# Patient Record
Sex: FEMALE | Race: WHITE | NOT HISPANIC OR LATINO | Employment: OTHER | URBAN - METROPOLITAN AREA
[De-identification: names, ages, dates, MRNs, and addresses within clinical notes are randomized per-mention and may not be internally consistent; named-entity substitution may affect disease eponyms.]

---

## 2020-03-08 LAB
INFLUENZA A ANTIGEN, POC: NEGATIVE
INFLUENZA B ANTIGEN, POC: NEGATIVE
RAPID GROUP A STREP (OHS): NEGATIVE

## 2020-06-12 ENCOUNTER — HISTORICAL (OUTPATIENT)
Dept: ADMINISTRATIVE | Facility: HOSPITAL | Age: 70
End: 2020-06-12

## 2020-06-12 LAB
ABS NEUT (OLG): 3.6 X10(3)/MCL (ref 2.1–9.2)
ALBUMIN SERPL-MCNC: 4 GM/DL (ref 3.4–5)
ALBUMIN/GLOB SERPL: 1.2 RATIO (ref 1.1–2)
ALP SERPL-CCNC: 77 UNIT/L (ref 40–150)
ALT SERPL-CCNC: 10 UNIT/L (ref 0–55)
APPEARANCE, UA: CLEAR
AST SERPL-CCNC: 18 UNIT/L (ref 5–34)
BACTERIA SPEC CULT: NORMAL /HPF
BASOPHILS # BLD AUTO: 0 X10(3)/MCL (ref 0–0.2)
BASOPHILS NFR BLD AUTO: 1 %
BILIRUB SERPL-MCNC: 0.5 MG/DL
BILIRUB UR QL STRIP: NEGATIVE
BILIRUBIN DIRECT+TOT PNL SERPL-MCNC: 0.2 MG/DL (ref 0–0.5)
BILIRUBIN DIRECT+TOT PNL SERPL-MCNC: 0.3 MG/DL (ref 0–0.8)
BUN SERPL-MCNC: 12.5 MG/DL (ref 9.8–20.1)
CALCIUM SERPL-MCNC: 9.1 MG/DL (ref 8.4–10.2)
CHLORIDE SERPL-SCNC: 102 MMOL/L (ref 98–107)
CHOLEST SERPL-MCNC: 195 MG/DL
CHOLEST/HDLC SERPL: 3 {RATIO} (ref 0–5)
CO2 SERPL-SCNC: 30 MMOL/L (ref 23–31)
COLOR UR: YELLOW
CREAT SERPL-MCNC: 0.71 MG/DL (ref 0.55–1.02)
DEPRECATED CALCIDIOL+CALCIFEROL SERPL-MC: 64.2 NG/ML (ref 6.6–49.9)
EOSINOPHIL # BLD AUTO: 0.2 X10(3)/MCL (ref 0–0.9)
EOSINOPHIL NFR BLD AUTO: 3 %
ERYTHROCYTE [DISTWIDTH] IN BLOOD BY AUTOMATED COUNT: 12.4 % (ref 11.5–17)
GLOBULIN SER-MCNC: 3.2 GM/DL (ref 2.4–3.5)
GLUCOSE (UA): NEGATIVE
GLUCOSE SERPL-MCNC: 99 MG/DL (ref 82–115)
HCT VFR BLD AUTO: 40.4 % (ref 37–47)
HDLC SERPL-MCNC: 64 MG/DL (ref 35–60)
HGB BLD-MCNC: 12.9 GM/DL (ref 12–16)
HGB UR QL STRIP: NEGATIVE
KETONES UR QL STRIP: NEGATIVE
LDLC SERPL CALC-MCNC: 118 MG/DL (ref 50–140)
LEUKOCYTE ESTERASE UR QL STRIP: NEGATIVE
LYMPHOCYTES # BLD AUTO: 1.5 X10(3)/MCL (ref 0.6–4.6)
LYMPHOCYTES NFR BLD AUTO: 26 %
MCH RBC QN AUTO: 29.9 PG (ref 27–31)
MCHC RBC AUTO-ENTMCNC: 31.9 GM/DL (ref 33–36)
MCV RBC AUTO: 93.7 FL (ref 80–94)
MONOCYTES # BLD AUTO: 0.6 X10(3)/MCL (ref 0.1–1.3)
MONOCYTES NFR BLD AUTO: 9 %
NEUTROPHILS # BLD AUTO: 3.6 X10(3)/MCL (ref 2.1–9.2)
NEUTROPHILS NFR BLD AUTO: 61 %
NITRITE UR QL STRIP: NEGATIVE
PH UR STRIP: 7.5 [PH] (ref 5–9)
PLATELET # BLD AUTO: 274 X10(3)/MCL (ref 130–400)
PMV BLD AUTO: 9.1 FL (ref 9.4–12.4)
POTASSIUM SERPL-SCNC: 4.7 MMOL/L (ref 3.5–5.1)
PROT SERPL-MCNC: 7.2 GM/DL (ref 5.8–7.6)
PROT UR QL STRIP: NEGATIVE
RBC # BLD AUTO: 4.31 X10(6)/MCL (ref 4.2–5.4)
RBC #/AREA URNS HPF: NORMAL /[HPF]
SODIUM SERPL-SCNC: 140 MMOL/L (ref 136–145)
SP GR UR STRIP: 1.02 (ref 1–1.03)
SQUAMOUS EPITHELIAL, UA: NORMAL
T4 FREE SERPL-MCNC: 1.15 NG/DL (ref 0.7–1.48)
TRIGL SERPL-MCNC: 65 MG/DL (ref 37–140)
TSH SERPL-ACNC: 0.75 UIU/ML (ref 0.35–4.94)
UROBILINOGEN UR STRIP-ACNC: 0.2
VLDLC SERPL CALC-MCNC: 13 MG/DL
WBC # SPEC AUTO: 5.9 X10(3)/MCL (ref 4.5–11.5)
WBC #/AREA URNS HPF: NORMAL /[HPF]

## 2020-08-12 ENCOUNTER — HISTORICAL (OUTPATIENT)
Dept: ADMINISTRATIVE | Facility: HOSPITAL | Age: 70
End: 2020-08-12

## 2020-08-12 LAB
T4 FREE SERPL-MCNC: 1.26 NG/DL (ref 0.7–1.48)
TSH SERPL-ACNC: 0.63 UIU/ML (ref 0.35–4.94)

## 2021-01-05 ENCOUNTER — HISTORICAL (OUTPATIENT)
Dept: ADMINISTRATIVE | Facility: HOSPITAL | Age: 71
End: 2021-01-05

## 2021-01-05 LAB
T4 FREE SERPL-MCNC: 1.1 NG/DL (ref 0.7–1.48)
TSH SERPL-ACNC: 2.25 UIU/ML (ref 0.35–4.94)

## 2021-04-07 ENCOUNTER — HISTORICAL (OUTPATIENT)
Dept: ADMINISTRATIVE | Facility: HOSPITAL | Age: 71
End: 2021-04-07

## 2021-04-07 LAB
ABS NEUT (OLG): 2.97 X10(3)/MCL (ref 2.1–9.2)
ALBUMIN SERPL-MCNC: 4 GM/DL (ref 3.4–4.8)
ALBUMIN/GLOB SERPL: 1.3 RATIO (ref 1.1–2)
ALP SERPL-CCNC: 78 UNIT/L (ref 40–150)
ALT SERPL-CCNC: 11 UNIT/L (ref 0–55)
APPEARANCE, UA: CLEAR
AST SERPL-CCNC: 22 UNIT/L (ref 5–34)
BACTERIA SPEC CULT: NORMAL /HPF
BASOPHILS # BLD AUTO: 0 X10(3)/MCL (ref 0–0.2)
BASOPHILS NFR BLD AUTO: 1 %
BILIRUB SERPL-MCNC: 0.4 MG/DL
BILIRUB UR QL STRIP: NEGATIVE
BILIRUBIN DIRECT+TOT PNL SERPL-MCNC: 0.2 MG/DL (ref 0–0.5)
BILIRUBIN DIRECT+TOT PNL SERPL-MCNC: 0.2 MG/DL (ref 0–0.8)
BUN SERPL-MCNC: 11.6 MG/DL (ref 9.8–20.1)
CALCIUM SERPL-MCNC: 9.4 MG/DL (ref 8.4–10.2)
CHLORIDE SERPL-SCNC: 104 MMOL/L (ref 98–107)
CHOLEST SERPL-MCNC: 201 MG/DL
CHOLEST/HDLC SERPL: 3 {RATIO} (ref 0–5)
CO2 SERPL-SCNC: 29 MMOL/L (ref 23–31)
COLOR UR: YELLOW
CREAT SERPL-MCNC: 0.68 MG/DL (ref 0.55–1.02)
DEPRECATED CALCIDIOL+CALCIFEROL SERPL-MC: 67.2 NG/ML (ref 30–80)
EOSINOPHIL # BLD AUTO: 0.2 X10(3)/MCL (ref 0–0.9)
EOSINOPHIL NFR BLD AUTO: 4 %
ERYTHROCYTE [DISTWIDTH] IN BLOOD BY AUTOMATED COUNT: 12.4 % (ref 11.5–17)
GLOBULIN SER-MCNC: 3 GM/DL (ref 2.4–3.5)
GLUCOSE (UA): NEGATIVE
GLUCOSE SERPL-MCNC: 92 MG/DL (ref 82–115)
HCT VFR BLD AUTO: 38.5 % (ref 37–47)
HDLC SERPL-MCNC: 66 MG/DL (ref 35–60)
HGB BLD-MCNC: 12.4 GM/DL (ref 12–16)
HGB UR QL STRIP: NEGATIVE
KETONES UR QL STRIP: NEGATIVE
LDLC SERPL CALC-MCNC: 122 MG/DL (ref 50–140)
LEUKOCYTE ESTERASE UR QL STRIP: NEGATIVE
LYMPHOCYTES # BLD AUTO: 1.5 X10(3)/MCL (ref 0.6–4.6)
LYMPHOCYTES NFR BLD AUTO: 30 %
MCH RBC QN AUTO: 30.7 PG (ref 27–31)
MCHC RBC AUTO-ENTMCNC: 32.2 GM/DL (ref 33–36)
MCV RBC AUTO: 95.3 FL (ref 80–94)
MONOCYTES # BLD AUTO: 0.4 X10(3)/MCL (ref 0.1–1.3)
MONOCYTES NFR BLD AUTO: 9 %
NEUTROPHILS # BLD AUTO: 2.97 X10(3)/MCL (ref 2.1–9.2)
NEUTROPHILS NFR BLD AUTO: 57 %
NITRITE UR QL STRIP: NEGATIVE
PH UR STRIP: 8 [PH] (ref 5–9)
PLATELET # BLD AUTO: 271 X10(3)/MCL (ref 130–400)
PMV BLD AUTO: 9.8 FL (ref 9.4–12.4)
POTASSIUM SERPL-SCNC: 4.4 MMOL/L (ref 3.5–5.1)
PROT SERPL-MCNC: 7 GM/DL (ref 5.8–7.6)
PROT UR QL STRIP: NEGATIVE
RBC # BLD AUTO: 4.04 X10(6)/MCL (ref 4.2–5.4)
RBC #/AREA URNS HPF: NORMAL /[HPF]
SODIUM SERPL-SCNC: 144 MMOL/L (ref 136–145)
SP GR UR STRIP: 1.02 (ref 1–1.03)
SQUAMOUS EPITHELIAL, UA: NORMAL /HPF (ref 0–4)
TRIGL SERPL-MCNC: 66 MG/DL (ref 37–140)
TSH SERPL-ACNC: 1.94 UIU/ML (ref 0.35–4.94)
UROBILINOGEN UR STRIP-ACNC: 0.2
VLDLC SERPL CALC-MCNC: 13 MG/DL
WBC # SPEC AUTO: 5.2 X10(3)/MCL (ref 4.5–11.5)
WBC #/AREA URNS HPF: NORMAL /[HPF]

## 2021-04-29 ENCOUNTER — HISTORICAL (OUTPATIENT)
Dept: ADMINISTRATIVE | Facility: HOSPITAL | Age: 71
End: 2021-04-29

## 2021-08-03 ENCOUNTER — HISTORICAL (OUTPATIENT)
Dept: ADMINISTRATIVE | Facility: HOSPITAL | Age: 71
End: 2021-08-03

## 2021-09-07 ENCOUNTER — HISTORICAL (OUTPATIENT)
Dept: ADMINISTRATIVE | Facility: HOSPITAL | Age: 71
End: 2021-09-07

## 2021-09-07 LAB — TSH SERPL-ACNC: 2.15 UIU/ML (ref 0.35–4.94)

## 2021-10-11 ENCOUNTER — HISTORICAL (OUTPATIENT)
Dept: LAB | Facility: HOSPITAL | Age: 71
End: 2021-10-11

## 2021-10-11 LAB
ABS NEUT (OLG): 4.17 X10(3)/MCL (ref 2.1–9.2)
ALBUMIN SERPL-MCNC: 4.1 GM/DL (ref 3.4–4.8)
ALBUMIN/GLOB SERPL: 1.3 RATIO (ref 1.1–2)
ALP SERPL-CCNC: 75 UNIT/L (ref 40–150)
ALT SERPL-CCNC: 10 UNIT/L (ref 0–55)
APPEARANCE, UA: CLEAR
AST SERPL-CCNC: 20 UNIT/L (ref 5–34)
BACTERIA SPEC CULT: NORMAL /HPF
BASOPHILS # BLD AUTO: 0 X10(3)/MCL (ref 0–0.2)
BASOPHILS NFR BLD AUTO: 1 %
BILIRUB SERPL-MCNC: 0.4 MG/DL
BILIRUB UR QL STRIP: NEGATIVE
BILIRUBIN DIRECT+TOT PNL SERPL-MCNC: 0.2 MG/DL (ref 0–0.5)
BILIRUBIN DIRECT+TOT PNL SERPL-MCNC: 0.2 MG/DL (ref 0–0.8)
BUN SERPL-MCNC: 16.7 MG/DL (ref 9.8–20.1)
CALCIUM SERPL-MCNC: 10 MG/DL (ref 8.4–10.2)
CHLORIDE SERPL-SCNC: 105 MMOL/L (ref 98–107)
CHOLEST SERPL-MCNC: 216 MG/DL
CHOLEST/HDLC SERPL: 3 {RATIO} (ref 0–5)
CO2 SERPL-SCNC: 29 MMOL/L (ref 23–31)
COLOR UR: YELLOW
CREAT SERPL-MCNC: 0.83 MG/DL (ref 0.55–1.02)
EOSINOPHIL # BLD AUTO: 0.2 X10(3)/MCL (ref 0–0.9)
EOSINOPHIL NFR BLD AUTO: 2 %
ERYTHROCYTE [DISTWIDTH] IN BLOOD BY AUTOMATED COUNT: 13 % (ref 11.5–17)
GLOBULIN SER-MCNC: 3.2 GM/DL (ref 2.4–3.5)
GLUCOSE (UA): NEGATIVE
GLUCOSE SERPL-MCNC: 100 MG/DL (ref 82–115)
HCT VFR BLD AUTO: 37.5 % (ref 37–47)
HDLC SERPL-MCNC: 72 MG/DL (ref 35–60)
HGB BLD-MCNC: 11.7 GM/DL (ref 12–16)
HGB UR QL STRIP: NEGATIVE
KETONES UR QL STRIP: NEGATIVE
LDLC SERPL CALC-MCNC: 132 MG/DL (ref 50–140)
LEUKOCYTE ESTERASE UR QL STRIP: NEGATIVE
LYMPHOCYTES # BLD AUTO: 1.8 X10(3)/MCL (ref 0.6–4.6)
LYMPHOCYTES NFR BLD AUTO: 27 %
MCH RBC QN AUTO: 30.7 PG (ref 27–31)
MCHC RBC AUTO-ENTMCNC: 31.2 GM/DL (ref 33–36)
MCV RBC AUTO: 98.4 FL (ref 80–94)
MONOCYTES # BLD AUTO: 0.5 X10(3)/MCL (ref 0.1–1.3)
MONOCYTES NFR BLD AUTO: 8 %
NEUTROPHILS # BLD AUTO: 4.17 X10(3)/MCL (ref 2.1–9.2)
NEUTROPHILS NFR BLD AUTO: 62 %
NITRITE UR QL STRIP: NEGATIVE
PH UR STRIP: 7 [PH] (ref 5–9)
PLATELET # BLD AUTO: 322 X10(3)/MCL (ref 130–400)
PMV BLD AUTO: 10.5 FL (ref 9.4–12.4)
POTASSIUM SERPL-SCNC: 4.6 MMOL/L (ref 3.5–5.1)
PROT SERPL-MCNC: 7.3 GM/DL (ref 5.8–7.6)
PROT UR QL STRIP: NEGATIVE
RBC # BLD AUTO: 3.81 X10(6)/MCL (ref 4.2–5.4)
RBC #/AREA URNS HPF: NORMAL /[HPF]
SODIUM SERPL-SCNC: 142 MMOL/L (ref 136–145)
SP GR UR STRIP: 1.02 (ref 1–1.03)
SQUAMOUS EPITHELIAL, UA: NORMAL /HPF (ref 0–4)
TRIGL SERPL-MCNC: 58 MG/DL (ref 37–140)
TSH SERPL-ACNC: 1.43 UIU/ML (ref 0.35–4.94)
UROBILINOGEN UR STRIP-ACNC: 0.2
VLDLC SERPL CALC-MCNC: 12 MG/DL
WBC # SPEC AUTO: 6.7 X10(3)/MCL (ref 4.5–11.5)
WBC #/AREA URNS HPF: NORMAL /[HPF]

## 2021-10-13 ENCOUNTER — HISTORICAL (OUTPATIENT)
Dept: ADMINISTRATIVE | Facility: HOSPITAL | Age: 71
End: 2021-10-13

## 2021-10-20 ENCOUNTER — HISTORICAL (OUTPATIENT)
Dept: ADMINISTRATIVE | Facility: HOSPITAL | Age: 71
End: 2021-10-20

## 2022-04-11 ENCOUNTER — HISTORICAL (OUTPATIENT)
Dept: ADMINISTRATIVE | Facility: HOSPITAL | Age: 72
End: 2022-04-11

## 2022-04-29 VITALS
BODY MASS INDEX: 38.91 KG/M2 | HEIGHT: 64 IN | SYSTOLIC BLOOD PRESSURE: 133 MMHG | OXYGEN SATURATION: 99 % | WEIGHT: 227.94 LBS | DIASTOLIC BLOOD PRESSURE: 79 MMHG

## 2022-05-09 ENCOUNTER — TELEPHONE (OUTPATIENT)
Dept: INTERNAL MEDICINE | Facility: CLINIC | Age: 72
End: 2022-05-09
Payer: MEDICARE

## 2022-05-09 DIAGNOSIS — I10 HYPERTENSION, UNSPECIFIED TYPE: Primary | ICD-10-CM

## 2022-05-09 DIAGNOSIS — E03.9 HYPOTHYROIDISM, UNSPECIFIED TYPE: ICD-10-CM

## 2022-05-09 NOTE — TELEPHONE ENCOUNTER
----- Message from Josy Cazares sent at 5/9/2022 11:06 AM CDT -----  Regarding: lab orders  Pt appt on 05/24 Does pt need any labs?

## 2022-05-17 ENCOUNTER — TELEPHONE (OUTPATIENT)
Dept: INTERNAL MEDICINE | Facility: CLINIC | Age: 72
End: 2022-05-17
Payer: MEDICARE

## 2022-05-17 DIAGNOSIS — E03.9 HYPOTHYROIDISM, UNSPECIFIED TYPE: ICD-10-CM

## 2022-05-17 DIAGNOSIS — I10 HYPERTENSION, UNSPECIFIED TYPE: Primary | ICD-10-CM

## 2022-05-17 NOTE — TELEPHONE ENCOUNTER
----- Message from Uriel Nguyen MA sent at 5/17/2022  7:49 AM CDT -----  Regarding: PV 5/24/22 @ 10:40 Dr. Cornejo  1. Are there any outstanding tasks in the patient's chart? Yes, fasting labs    2. Is there any documentation in the chart? No    3.Has patient been seen in an ER, Urgent care clinic, or been admitted since last visit?  If yes, When, where, and why    4. Has patient seen any other healthcare providers since last visit?  If yes, when, where, and why    5. Has patient had any bloodwork or XR done since last visit?    6. Is patient signed up for patient portal?

## 2022-05-24 ENCOUNTER — OFFICE VISIT (OUTPATIENT)
Dept: INTERNAL MEDICINE | Facility: CLINIC | Age: 72
End: 2022-05-24
Payer: MEDICARE

## 2022-05-24 VITALS
OXYGEN SATURATION: 96 % | RESPIRATION RATE: 16 BRPM | HEART RATE: 92 BPM | SYSTOLIC BLOOD PRESSURE: 132 MMHG | BODY MASS INDEX: 40.02 KG/M2 | HEIGHT: 64 IN | DIASTOLIC BLOOD PRESSURE: 76 MMHG | WEIGHT: 234.44 LBS | TEMPERATURE: 98 F

## 2022-05-24 DIAGNOSIS — Z78.0 POSTMENOPAUSAL STATE: ICD-10-CM

## 2022-05-24 DIAGNOSIS — I48.0 PAROXYSMAL ATRIAL FIBRILLATION: ICD-10-CM

## 2022-05-24 DIAGNOSIS — E66.01 MORBID OBESITY: ICD-10-CM

## 2022-05-24 DIAGNOSIS — M51.36 DEGENERATION OF INTERVERTEBRAL DISC OF LUMBAR REGION: Primary | ICD-10-CM

## 2022-05-24 DIAGNOSIS — M54.16 LUMBAR RADICULOPATHY, RIGHT: ICD-10-CM

## 2022-05-24 DIAGNOSIS — I10 HYPERTENSION, UNSPECIFIED TYPE: ICD-10-CM

## 2022-05-24 DIAGNOSIS — K21.9 GASTROESOPHAGEAL REFLUX DISEASE, UNSPECIFIED WHETHER ESOPHAGITIS PRESENT: ICD-10-CM

## 2022-05-24 DIAGNOSIS — E03.9 HYPOTHYROIDISM, UNSPECIFIED TYPE: ICD-10-CM

## 2022-05-24 PROBLEM — M51.369 DEGENERATION OF INTERVERTEBRAL DISC OF LUMBAR REGION: Status: ACTIVE | Noted: 2022-05-24

## 2022-05-24 PROCEDURE — 4010F ACE/ARB THERAPY RXD/TAKEN: CPT | Mod: CPTII,,, | Performed by: INTERNAL MEDICINE

## 2022-05-24 PROCEDURE — 99214 PR OFFICE/OUTPT VISIT, EST, LEVL IV, 30-39 MIN: ICD-10-PCS | Mod: ,,, | Performed by: INTERNAL MEDICINE

## 2022-05-24 PROCEDURE — 3288F PR FALLS RISK ASSESSMENT DOCUMENTED: ICD-10-PCS | Mod: CPTII,,, | Performed by: INTERNAL MEDICINE

## 2022-05-24 PROCEDURE — 1101F PT FALLS ASSESS-DOCD LE1/YR: CPT | Mod: CPTII,,, | Performed by: INTERNAL MEDICINE

## 2022-05-24 PROCEDURE — 3008F PR BODY MASS INDEX (BMI) DOCUMENTED: ICD-10-PCS | Mod: CPTII,,, | Performed by: INTERNAL MEDICINE

## 2022-05-24 PROCEDURE — 3288F FALL RISK ASSESSMENT DOCD: CPT | Mod: CPTII,,, | Performed by: INTERNAL MEDICINE

## 2022-05-24 PROCEDURE — 3078F PR MOST RECENT DIASTOLIC BLOOD PRESSURE < 80 MM HG: ICD-10-PCS | Mod: CPTII,,, | Performed by: INTERNAL MEDICINE

## 2022-05-24 PROCEDURE — 3008F BODY MASS INDEX DOCD: CPT | Mod: CPTII,,, | Performed by: INTERNAL MEDICINE

## 2022-05-24 PROCEDURE — 3075F PR MOST RECENT SYSTOLIC BLOOD PRESS GE 130-139MM HG: ICD-10-PCS | Mod: CPTII,,, | Performed by: INTERNAL MEDICINE

## 2022-05-24 PROCEDURE — 1159F PR MEDICATION LIST DOCUMENTED IN MEDICAL RECORD: ICD-10-PCS | Mod: CPTII,,, | Performed by: INTERNAL MEDICINE

## 2022-05-24 PROCEDURE — 1159F MED LIST DOCD IN RCRD: CPT | Mod: CPTII,,, | Performed by: INTERNAL MEDICINE

## 2022-05-24 PROCEDURE — 4010F PR ACE/ARB THEARPY RXD/TAKEN: ICD-10-PCS | Mod: CPTII,,, | Performed by: INTERNAL MEDICINE

## 2022-05-24 PROCEDURE — 1101F PR PT FALLS ASSESS DOC 0-1 FALLS W/OUT INJ PAST YR: ICD-10-PCS | Mod: CPTII,,, | Performed by: INTERNAL MEDICINE

## 2022-05-24 PROCEDURE — 3075F SYST BP GE 130 - 139MM HG: CPT | Mod: CPTII,,, | Performed by: INTERNAL MEDICINE

## 2022-05-24 PROCEDURE — 3078F DIAST BP <80 MM HG: CPT | Mod: CPTII,,, | Performed by: INTERNAL MEDICINE

## 2022-05-24 PROCEDURE — 99214 OFFICE O/P EST MOD 30 MIN: CPT | Mod: ,,, | Performed by: INTERNAL MEDICINE

## 2022-05-24 PROCEDURE — 1160F RVW MEDS BY RX/DR IN RCRD: CPT | Mod: CPTII,,, | Performed by: INTERNAL MEDICINE

## 2022-05-24 PROCEDURE — 1160F PR REVIEW ALL MEDS BY PRESCRIBER/CLIN PHARMACIST DOCUMENTED: ICD-10-PCS | Mod: CPTII,,, | Performed by: INTERNAL MEDICINE

## 2022-05-24 RX ORDER — LEVOTHYROXINE SODIUM 112 UG/1
112 TABLET ORAL DAILY
COMMUNITY
Start: 2022-03-07 | End: 2023-04-24

## 2022-05-24 RX ORDER — CETIRIZINE HYDROCHLORIDE 10 MG/1
10 TABLET ORAL DAILY
COMMUNITY
Start: 2022-03-10 | End: 2024-02-07

## 2022-05-24 RX ORDER — RIVAROXABAN 20 MG/1
20 TABLET, FILM COATED ORAL DAILY
COMMUNITY
Start: 2022-02-07 | End: 2024-02-07

## 2022-05-24 RX ORDER — DILTIAZEM HYDROCHLORIDE 120 MG/1
120 CAPSULE, COATED, EXTENDED RELEASE ORAL DAILY
COMMUNITY
Start: 2022-02-15

## 2022-05-24 RX ORDER — LISINOPRIL 10 MG/1
10 TABLET ORAL DAILY
COMMUNITY
Start: 2022-03-21 | End: 2023-06-05

## 2022-05-24 RX ORDER — ACETAMINOPHEN 500 MG
5000 TABLET ORAL DAILY
COMMUNITY

## 2022-05-24 RX ORDER — OMEPRAZOLE 20 MG/1
20 CAPSULE, DELAYED RELEASE ORAL 2 TIMES DAILY
COMMUNITY
Start: 2022-04-07

## 2022-05-24 RX ORDER — GABAPENTIN 100 MG/1
100 CAPSULE ORAL 3 TIMES DAILY
Qty: 270 CAPSULE | Refills: 3 | Status: SHIPPED | OUTPATIENT
Start: 2022-05-24 | End: 2022-05-31 | Stop reason: SDUPTHER

## 2022-05-24 RX ORDER — GABAPENTIN 100 MG/1
100 CAPSULE ORAL 3 TIMES DAILY
COMMUNITY
Start: 2022-01-18 | End: 2022-05-24 | Stop reason: SDUPTHER

## 2022-05-24 RX ORDER — MELATONIN 3 MG
3 CAPSULE ORAL NIGHTLY
COMMUNITY
End: 2023-06-07 | Stop reason: SDUPTHER

## 2022-05-24 RX ORDER — DULOXETIN HYDROCHLORIDE 30 MG/1
1 CAPSULE, DELAYED RELEASE ORAL NIGHTLY
COMMUNITY
Start: 2021-11-08 | End: 2022-10-25

## 2022-05-24 RX ORDER — ALPRAZOLAM 0.25 MG/1
0.25 TABLET ORAL
COMMUNITY
End: 2022-10-25

## 2022-05-24 RX ORDER — HYDROCHLOROTHIAZIDE 25 MG/1
25 TABLET ORAL DAILY
COMMUNITY
Start: 2022-03-16 | End: 2022-06-14

## 2022-05-24 NOTE — PROGRESS NOTES
Subjective:      Patient ID: Ngozi Webb is a 72 y.o. female.    Chief Complaint: Hypertension and Thyroid Problem      HPI:  71 year old female presents to clinic for 6 month revisit  Hx of lumbar laminectomy 1983?  Kyphoplasty in 2014 after L4 fracture after sustaining a fall.  Has osteopenia.  Dr. 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 ER/AR + and HER-2 status unknown.  History of Barrets in 2006; Dr. Jean had EGD 10/2021   Has osteopenia.  C scope in [facility]  COVID vaccines UTD  Needs flu vaccine and shingles vaccine  Dr. Vu with [facility] 1/2022; 3/2022 with no improvement; referred to Cassius Hammer for surgical consideration for SCS vs Surgical eval. Dr. Lam appointment pending for June 1st. Had fall in January with recent MRI in January.        Problem List Items Addressed This Visit        Neuro    Degeneration of intervertebral disc of lumbar region - Primary    Lumbar radiculopathy, right    Current Assessment & Plan     Has upcoming appointment in [place] with Dr. Lam, request MRI report from January.              Cardiac/Vascular    Hypertension    Current Assessment & Plan     At goal, continue current regimen           Paroxysmal atrial fibrillation    Current Assessment & Plan     Continue anticoagulation and rate control              Endocrine    Hypothyroidism    Overview     Formatting of this note might be different from the original.  ICD-10 Transition           Current Assessment & Plan     Continue current management, RTC 6 months for wellness, sooner if needed           Morbid obesity       GI    Gastroesophageal reflux disease      Other Visit Diagnoses     Postmenopausal state        Relevant Orders    DXA Bone Density Spine And Hip              Past Medical History:  Past Medical History:   Diagnosis Date    DDD (degenerative disc disease), lumbar     GERD (gastroesophageal reflux disease)      "Hypertension     Hyperthyroidism     Lumbar radiculopathy     Morbid obesity     Osteoarthritis of both knees     Paroxysmal atrial fibrillation      Past Surgical History:   Procedure Laterality Date     SECTION      CHOLECYSTECTOMY      COLONOSCOPY  10/04/2021     Review of patient's allergies indicates:   Allergen Reactions    Meperidine Nausea Only    Midazolam Nausea And Vomiting     "Makes her sick"       Current Outpatient Medications on File Prior to Visit   Medication Sig Dispense Refill    ALPRAZolam (XANAX) 0.25 MG tablet Take 0.25 mg by mouth as needed.      calcium carbonate (CALCIUM 600 ORAL) Take 1,200 mg by mouth Daily.      cetirizine (ZYRTEC) 10 MG tablet Take 10 mg by mouth once daily.      cholecalciferol, vitamin D3, (VITAMIN D3) 50 mcg (2,000 unit) Cap capsule Take 5,000 Units by mouth Daily.      diltiaZEM (CARDIZEM CD) 120 MG Cp24 Take 120 mg by mouth once daily.      DULoxetine (CYMBALTA) 30 MG capsule Take 1 capsule by mouth nightly.      hydroCHLOROthiazide (HYDRODIURIL) 25 MG tablet Take 25 mg by mouth once daily.      lisinopriL 10 MG tablet Take 10 mg by mouth once daily.      melatonin 3 mg Cap Take 3 mg by mouth nightly.      omeprazole (PRILOSEC) 20 MG capsule Take 20 mg by mouth 2 (two) times daily.      SYNTHROID 112 mcg tablet Take 112 mcg by mouth once daily.      XARELTO 20 mg Tab Take 20 mg by mouth once daily.       No current facility-administered medications on file prior to visit.     Social History     Socioeconomic History    Marital status:    Tobacco Use    Smoking status: Never Smoker    Smokeless tobacco: Never Used   Substance and Sexual Activity    Alcohol use: Not Currently    Drug use: Never    Sexual activity: Not Currently     Family History   Problem Relation Age of Onset    Uterine cancer Mother            Review of Systems  Constitutional: No fever,  no fatigue, no chills, no night sweats, no weight gain, no weight " "loss, no changes in appetite.   Eye: No redness, no acute vision loss, no blurred vision, no double vision, no eye pain  ENMT: No sore throat, no nasal drainage, no nose bleeds,  no headache, no ear pain, no ear drainage, no acute hearing loss  Respiratory: No cough, no sputum production, no shortness of breath, no hemoptysis, no wheezing.  Cardiovascular: No chest pain, no chest tightness, no BELL, no PND, no orthopnea, no swelling, no palpitations.  Gastrointestinal: No abdominal pain, no nausea, no vomiting, no diarrhea, no constipation, no difficulty swallowing, no change in bowel habits, no rectal bleeding  Genitourinary: no urgency, no frequency, no burning or pain when urinating, no blood in urine, no incontinence  Heme/Lymph: No easy bruising and/or bleeding, no swollen or painful glands.  Endocrine: No polyuria, no polydipsia, no polyphagia, no heat or cold intolerance.  Musculoskeletal: No muscle pain, no muscle weakness, no joint pain, no red or swollen joints.  Integumentary: No rash, no pruritis, no hair or nail changes.  Neurologic: No dizziness, no fainting, no tremors, + tingling and/ or numbness.  Psychiatric: No anxiety, no depression, no memory loss  All Other ROS: Negative with exception of what is documented in the history of present illness     Objective:   /76 (BP Location: Right arm, Patient Position: Sitting, BP Method: Large (Manual))   Pulse 92   Temp 97.6 °F (36.4 °C) (Temporal)   Resp 16   Ht 5' 4" (1.626 m)   Wt 106.4 kg (234 lb 7.4 oz)   SpO2 96%   BMI 40.24 kg/m²     Physical Exam  General : Alert and oriented, No acute distress, well, developed, well nourished, afebrile , Obese  Eye : PERRLA. EOMI. Normal conjunctiva without injection. Sclerae are nonicteric. No pallor.  HEENT : Normocephalic. Neck supple. Normal hearing. Oral mucosa is moist.  Respiratory : Lungs are clear to auscultation bilaterally, non-labored. Symmetrical chest wall expansion. No crackles, wheeze, or " rhonci.  Cardiovascular : Normal rate, Regular rhythm. No murmurs, rubs, or gallops. Pulses 2+ in all extremities. No Edema.  Gastrointestinal : Soft, nontender, non-distended, bowel sounds normal, no organomegaly, no guarding, no rebound.  Musculoskeletal : Normal ROM.  No muscle tenderness.  Integumentary : Warm to touch. Intact. No rash.    Neurologic : Alert and oriented. No focal deficits. Cranial Nerves II-XII are grossly intact.  Lymph: No palpable lymphadenopathy appreciated.  Psychiatric : Cooperative, Appropriate mood & affect. Normal judgment.          Assessment:     1. Degeneration of intervertebral disc of lumbar region    2. Hypertension, unspecified type    3. Paroxysmal atrial fibrillation    4. Hypothyroidism, unspecified type    5. Morbid obesity    6. Gastroesophageal reflux disease, unspecified whether esophagitis present    7. Lumbar radiculopathy, right    8. Postmenopausal state                  Plan:       I have changed Ngozi Webb's gabapentin. I am also having her maintain her calcium carbonate (CALCIUM 600 ORAL), melatonin, cholecalciferol (vitamin D3), ALPRAZolam, cetirizine, diltiaZEM, DULoxetine, hydroCHLOROthiazide, SYNTHROID, lisinopriL, omeprazole, and XARELTO.      Problem List Items Addressed This Visit        Neuro    Degeneration of intervertebral disc of lumbar region - Primary    Lumbar radiculopathy, right     Has upcoming appointment in [place] with Dr. Lam, request MRI report from January.              Cardiac/Vascular    Hypertension     At goal, continue current regimen           Paroxysmal atrial fibrillation     Continue anticoagulation and rate control              Endocrine    Hypothyroidism     Continue current management, RTC 6 months for wellness, sooner if needed           Morbid obesity       GI    Gastroesophageal reflux disease      Other Visit Diagnoses     Postmenopausal state        Relevant Orders    DXA Bone Density Spine And Hip             Ngozi was seen today for hypertension and thyroid problem.    Diagnoses and all orders for this visit:    Degeneration of intervertebral disc of lumbar region    Hypertension, unspecified type    Paroxysmal atrial fibrillation    Hypothyroidism, unspecified type    Morbid obesity    Gastroesophageal reflux disease, unspecified whether esophagitis present    Lumbar radiculopathy, right    Postmenopausal state  -     DXA Bone Density Spine And Hip; Future    Other orders  -     gabapentin (NEURONTIN) 100 MG capsule; Take 1 capsule (100 mg total) by mouth 3 (three) times daily.            Medications Ordered This Encounter   Medications    gabapentin (NEURONTIN) 100 MG capsule     Sig: Take 1 capsule (100 mg total) by mouth 3 (three) times daily.     Dispense:  270 capsule     Refill:  3     [unfilled]  Orders Placed This Encounter   Procedures    DXA Bone Density Spine And Hip     Standing Status:   Future     Standing Expiration Date:   5/24/2025     Order Specific Question:   May the Radiologist modify the order per protocol to meet the clinical needs of the patient?     Answer:   Yes     Order Specific Question:   Release to patient     Answer:   Immediate       Medication List with Changes/Refills   New Medications    GABAPENTIN (NEURONTIN) 100 MG CAPSULE    Take 1 capsule (100 mg total) by mouth 3 (three) times daily.   Current Medications    ALPRAZOLAM (XANAX) 0.25 MG TABLET    Take 0.25 mg by mouth as needed.    CALCIUM CARBONATE (CALCIUM 600 ORAL)    Take 1,200 mg by mouth Daily.    CETIRIZINE (ZYRTEC) 10 MG TABLET    Take 10 mg by mouth once daily.    CHOLECALCIFEROL, VITAMIN D3, (VITAMIN D3) 50 MCG (2,000 UNIT) CAP CAPSULE    Take 5,000 Units by mouth Daily.    DILTIAZEM (CARDIZEM CD) 120 MG CP24    Take 120 mg by mouth once daily.    DULOXETINE (CYMBALTA) 30 MG CAPSULE    Take 1 capsule by mouth nightly.    HYDROCHLOROTHIAZIDE (HYDRODIURIL) 25 MG TABLET    Take 25 mg by mouth once daily.     LISINOPRIL 10 MG TABLET    Take 10 mg by mouth once daily.    MELATONIN 3 MG CAP    Take 3 mg by mouth nightly.    OMEPRAZOLE (PRILOSEC) 20 MG CAPSULE    Take 20 mg by mouth 2 (two) times daily.    SYNTHROID 112 MCG TABLET    Take 112 mcg by mouth once daily.    XARELTO 20 MG TAB    Take 20 mg by mouth once daily.   Discontinued Medications    GABAPENTIN (NEURONTIN) 100 MG CAPSULE    Take 100 mg by mouth 3 (three) times daily.      Medication List with Changes/Refills   New Medications    GABAPENTIN (NEURONTIN) 100 MG CAPSULE    Take 1 capsule (100 mg total) by mouth 3 (three) times daily.       Start Date: 5/24/2022 End Date: --   Current Medications    ALPRAZOLAM (XANAX) 0.25 MG TABLET    Take 0.25 mg by mouth as needed.       Start Date: --        End Date: --    CALCIUM CARBONATE (CALCIUM 600 ORAL)    Take 1,200 mg by mouth Daily.       Start Date: --        End Date: --    CETIRIZINE (ZYRTEC) 10 MG TABLET    Take 10 mg by mouth once daily.       Start Date: 3/10/2022 End Date: --    CHOLECALCIFEROL, VITAMIN D3, (VITAMIN D3) 50 MCG (2,000 UNIT) CAP CAPSULE    Take 5,000 Units by mouth Daily.       Start Date: --        End Date: --    DILTIAZEM (CARDIZEM CD) 120 MG CP24    Take 120 mg by mouth once daily.       Start Date: 2/15/2022 End Date: --    DULOXETINE (CYMBALTA) 30 MG CAPSULE    Take 1 capsule by mouth nightly.       Start Date: 11/8/2021 End Date: --    HYDROCHLOROTHIAZIDE (HYDRODIURIL) 25 MG TABLET    Take 25 mg by mouth once daily.       Start Date: 3/16/2022 End Date: --    LISINOPRIL 10 MG TABLET    Take 10 mg by mouth once daily.       Start Date: 3/21/2022 End Date: --    MELATONIN 3 MG CAP    Take 3 mg by mouth nightly.       Start Date: --        End Date: --    OMEPRAZOLE (PRILOSEC) 20 MG CAPSULE    Take 20 mg by mouth 2 (two) times daily.       Start Date: 4/7/2022  End Date: --    SYNTHROID 112 MCG TABLET    Take 112 mcg by mouth once daily.       Start Date: 3/7/2022  End Date: --    XARELTO  20 MG TAB    Take 20 mg by mouth once daily.       Start Date: 2/7/2022  End Date: --   Discontinued Medications    GABAPENTIN (NEURONTIN) 100 MG CAPSULE    Take 100 mg by mouth 3 (three) times daily.       Start Date: 1/18/2022 End Date: 5/24/2022            Follow up in about 6 months (around 11/24/2022) for NURSE PRACTITIONER, with labs prior to visit, [facility].

## 2022-05-24 NOTE — PROGRESS NOTES
"Subjective:      Patient ID: Ngozi Webb is a 72 y.o. female.    Chief Complaint: Hypertension and Thyroid Problem      HPI:  71 year old female presents to clinic for 6 month revisit  Hx of lumbar laminectomy ?  Kyphoplasty in  after L4 fracture after sustaining a fall.  Has osteopenia.  Dr. 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 ER/AR + and HER-2 status unknown.  History of [facility]rets in ; Dr. Jean had EGD 10/2021   Has osteopenia.  C scope in [facility]  COVID vaccines UTD  Needs flu vaccine and shingles vaccine  Dr. Vu with [facility] 2022; 3/2022 with no improvement; referred to Cassius Hammer for surgical consideration for SCS vs Surgical eval. Dr. Lam appointment pending for . Had fall in January with recent MRI in January.        Problem List Items Addressed This Visit    None             Past Medical History:  Past Medical History:   Diagnosis Date    DDD (degenerative disc disease), lumbar     GERD (gastroesophageal reflux disease)     Hypertension     Hyperthyroidism     Lumbar radiculopathy     Morbid obesity     Osteoarthritis of both knees     Paroxysmal atrial fibrillation      Past Surgical History:   Procedure Laterality Date     SECTION      CHOLECYSTECTOMY      COLONOSCOPY  10/04/2021     Review of patient's allergies indicates:   Allergen Reactions    Meperidine Nausea Only    Midazolam Nausea And Vomiting     "Makes her sick"       Current Outpatient Medications on File Prior to Visit   Medication Sig Dispense Refill    ALPRAZolam (XANAX) 0.25 MG tablet Take 0.25 mg by mouth as needed.      calcium carbonate (CALCIUM 600 ORAL) Take 1,200 mg by mouth Daily.      cetirizine (ZYRTEC) 10 MG tablet Take 10 mg by mouth once daily.      cholecalciferol, vitamin D3, (VITAMIN D3) 50 mcg (2,000 unit) Cap capsule Take 5,000 Units by mouth Daily.      diltiaZEM (CARDIZEM CD) 120 MG " Cp24 Take 120 mg by mouth once daily.      DULoxetine (CYMBALTA) 30 MG capsule Take 1 capsule by mouth nightly.      gabapentin (NEURONTIN) 100 MG capsule Take 100 mg by mouth 3 (three) times daily.      hydroCHLOROthiazide (HYDRODIURIL) 25 MG tablet Take 25 mg by mouth once daily.      lisinopriL 10 MG tablet Take 10 mg by mouth once daily.      melatonin 3 mg Cap Take 3 mg by mouth nightly.      omeprazole (PRILOSEC) 20 MG capsule Take 20 mg by mouth 2 (two) times daily.      SYNTHROID 112 mcg tablet Take 112 mcg by mouth once daily.      XARELTO 20 mg Tab Take 20 mg by mouth once daily.       No current facility-administered medications on file prior to visit.     Social History     Socioeconomic History    Marital status:    Tobacco Use    Smoking status: Never Smoker    Smokeless tobacco: Never Used   Substance and Sexual Activity    Alcohol use: Not Currently    Drug use: Never    Sexual activity: Not Currently     Family History   Problem Relation Age of Onset    Uterine cancer Mother            Review of Systems  Constitutional: No fever,  no fatigue, no chills, no night sweats, no weight gain, no weight loss, no changes in appetite.   Eye: No redness, no acute vision loss, no blurred vision, no double vision, no eye pain  ENMT: No sore throat, no nasal drainage, no nose bleeds,  no headache, no ear pain, no ear drainage, no acute hearing loss  Respiratory: No cough, no sputum production, no shortness of breath, no hemoptysis, no wheezing.  Cardiovascular: No chest pain, no chest tightness, no BELL, no PND, no orthopnea, no swelling, no palpitations.  Gastrointestinal: No abdominal pain, no nausea, no vomiting, no diarrhea, no constipation, no difficulty swallowing, no change in bowel habits, no rectal bleeding  Genitourinary: no urgency, no frequency, no burning or pain when urinating, no blood in urine, no incontinence  Heme/Lymph: No easy bruising and/or bleeding, no swollen or  "painful glands.  Endocrine: No polyuria, no polydipsia, no polyphagia, no heat or cold intolerance.  Musculoskeletal: No muscle pain, no muscle weakness, no joint pain, no red or swollen joints.  Integumentary: No rash, no pruritis, no hair or nail changes.  Neurologic: No dizziness, no fainting, no tremors, + tingling and/ or numbness.  Psychiatric: No anxiety, no depression, no memory loss  All Other ROS: Negative with exception of what is documented in the history of present illness     Objective:   /76 (BP Location: Right arm, Patient Position: Sitting, BP Method: Large (Manual))   Pulse 92   Temp 97.6 °F (36.4 °C) (Temporal)   Resp 16   Ht 5' 4" (1.626 m)   Wt 106.4 kg (234 lb 7.4 oz)   SpO2 96%   BMI 40.24 kg/m²     Physical Exam  General : Alert and oriented, No acute distress, well, developed, well nourished, afebrile , Obese  Eye : PERRLA. EOMI. Normal conjunctiva without injection. Sclerae are nonicteric. No pallor.  HEENT : Normocephalic. Neck supple. Normal hearing. Oral mucosa is moist.  Respiratory : Lungs are clear to auscultation bilaterally, non-labored. Symmetrical chest wall expansion. No crackles, wheeze, or rhonci.  Cardiovascular : Normal rate, Regular rhythm. No murmurs, rubs, or gallops. Pulses 2+ in all extremities. No Edema.  Gastrointestinal : Soft, nontender, non-distended, bowel sounds normal, no organomegaly, no guarding, no rebound.  Musculoskeletal : Normal ROM.  No muscle tenderness.  Integumentary : Warm to touch. Intact. No rash.    Neurologic : Alert and oriented. No focal deficits. Cranial Nerves II-XII are grossly intact.  Lymph: No palpable lymphadenopathy appreciated.  Psychiatric : Cooperative, Appropriate mood & affect. Normal judgment.          Assessment:   No diagnosis found.              Plan:       I am having Ngozi Webb maintain her calcium carbonate (CALCIUM 600 ORAL), melatonin, cholecalciferol (vitamin D3), ALPRAZolam, cetirizine, diltiaZEM, " DULoxetine, gabapentin, hydroCHLOROthiazide, SYNTHROID, lisinopriL, omeprazole, and XARELTO.      Problem List Items Addressed This Visit    None           There are no diagnoses linked to this encounter.           [unfilled]  No orders of the defined types were placed in this encounter.      Medication List with Changes/Refills   Current Medications    ALPRAZOLAM (XANAX) 0.25 MG TABLET    Take 0.25 mg by mouth as needed.    CALCIUM CARBONATE (CALCIUM 600 ORAL)    Take 1,200 mg by mouth Daily.    CETIRIZINE (ZYRTEC) 10 MG TABLET    Take 10 mg by mouth once daily.    CHOLECALCIFEROL, VITAMIN D3, (VITAMIN D3) 50 MCG (2,000 UNIT) CAP CAPSULE    Take 5,000 Units by mouth Daily.    DILTIAZEM (CARDIZEM CD) 120 MG CP24    Take 120 mg by mouth once daily.    DULOXETINE (CYMBALTA) 30 MG CAPSULE    Take 1 capsule by mouth nightly.    GABAPENTIN (NEURONTIN) 100 MG CAPSULE    Take 100 mg by mouth 3 (three) times daily.    HYDROCHLOROTHIAZIDE (HYDRODIURIL) 25 MG TABLET    Take 25 mg by mouth once daily.    LISINOPRIL 10 MG TABLET    Take 10 mg by mouth once daily.    MELATONIN 3 MG CAP    Take 3 mg by mouth nightly.    OMEPRAZOLE (PRILOSEC) 20 MG CAPSULE    Take 20 mg by mouth 2 (two) times daily.    SYNTHROID 112 MCG TABLET    Take 112 mcg by mouth once daily.    XARELTO 20 MG TAB    Take 20 mg by mouth once daily.      Medication List with Changes/Refills   Current Medications    ALPRAZOLAM (XANAX) 0.25 MG TABLET    Take 0.25 mg by mouth as needed.       Start Date: --        End Date: --    CALCIUM CARBONATE (CALCIUM 600 ORAL)    Take 1,200 mg by mouth Daily.       Start Date: --        End Date: --    CETIRIZINE (ZYRTEC) 10 MG TABLET    Take 10 mg by mouth once daily.       Start Date: 3/10/2022 End Date: --    CHOLECALCIFEROL, VITAMIN D3, (VITAMIN D3) 50 MCG (2,000 UNIT) CAP CAPSULE    Take 5,000 Units by mouth Daily.       Start Date: --        End Date: --    DILTIAZEM (CARDIZEM CD) 120 MG CP24    Take 120 mg by mouth  once daily.       Start Date: 2/15/2022 End Date: --    DULOXETINE (CYMBALTA) 30 MG CAPSULE    Take 1 capsule by mouth nightly.       Start Date: 11/8/2021 End Date: --    GABAPENTIN (NEURONTIN) 100 MG CAPSULE    Take 100 mg by mouth 3 (three) times daily.       Start Date: 1/18/2022 End Date: --    HYDROCHLOROTHIAZIDE (HYDRODIURIL) 25 MG TABLET    Take 25 mg by mouth once daily.       Start Date: 3/16/2022 End Date: --    LISINOPRIL 10 MG TABLET    Take 10 mg by mouth once daily.       Start Date: 3/21/2022 End Date: --    MELATONIN 3 MG CAP    Take 3 mg by mouth nightly.       Start Date: --        End Date: --    OMEPRAZOLE (PRILOSEC) 20 MG CAPSULE    Take 20 mg by mouth 2 (two) times daily.       Start Date: 4/7/2022  End Date: --    SYNTHROID 112 MCG TABLET    Take 112 mcg by mouth once daily.       Start Date: 3/7/2022  End Date: --    XARELTO 20 MG TAB    Take 20 mg by mouth once daily.       Start Date: 2/7/2022  End Date: --            No follow-ups on file.

## 2022-05-31 RX ORDER — GABAPENTIN 100 MG/1
100 CAPSULE ORAL 3 TIMES DAILY
Qty: 270 CAPSULE | Refills: 3 | Status: SHIPPED | OUTPATIENT
Start: 2022-05-31 | End: 2022-07-27

## 2022-07-14 ENCOUNTER — TELEPHONE (OUTPATIENT)
Dept: INTERNAL MEDICINE | Facility: CLINIC | Age: 72
End: 2022-07-14
Payer: MEDICARE

## 2022-07-14 NOTE — TELEPHONE ENCOUNTER
Please call and get more info, did they treat her with anything? What is she taking?    She is not candidate for pa

## 2022-07-14 NOTE — TELEPHONE ENCOUNTER
Please call and get more info, did they treat her with anything? What is she taking?    She is not candidate for paxlovid due to interaction with some of the meds she is on    Does [facility] still have antibody infusions available? Let me know

## 2022-07-14 NOTE — TELEPHONE ENCOUNTER
----- Message from Josy Cazares sent at 7/14/2022  9:50 AM CDT -----  Pt tested positive for COVID  on Tuesday. Pt will have [facility] fax over her results to the office . Please still having fever, coughing and very congested. Please advise?

## 2022-07-15 ENCOUNTER — TELEPHONE (OUTPATIENT)
Dept: INTERNAL MEDICINE | Facility: CLINIC | Age: 72
End: 2022-07-15
Payer: MEDICARE

## 2022-07-15 RX ORDER — AZITHROMYCIN 250 MG/1
TABLET, FILM COATED ORAL
Qty: 6 TABLET | Refills: 0 | Status: SHIPPED | OUTPATIENT
Start: 2022-07-15 | End: 2022-07-20

## 2022-07-15 NOTE — TELEPHONE ENCOUNTER
Spoke to pt and she called [facility] this morning because no one called to schedule her, and they told her that they only have one dose left and there first opening is not until next week.   She would like a z-pac for her sinus congestion

## 2022-07-27 ENCOUNTER — HOSPITAL ENCOUNTER (OUTPATIENT)
Dept: RADIOLOGY | Facility: HOSPITAL | Age: 72
Discharge: HOME | End: 2022-07-27
Attending: INTERNAL MEDICINE
Payer: MEDICARE

## 2022-07-27 ENCOUNTER — OFFICE VISIT (OUTPATIENT)
Dept: INTERNAL MEDICINE | Facility: CLINIC | Age: 72
End: 2022-07-27
Payer: MEDICARE

## 2022-07-27 VITALS
RESPIRATION RATE: 16 BRPM | OXYGEN SATURATION: 95 % | TEMPERATURE: 97 F | WEIGHT: 229 LBS | BODY MASS INDEX: 39.09 KG/M2 | DIASTOLIC BLOOD PRESSURE: 78 MMHG | SYSTOLIC BLOOD PRESSURE: 134 MMHG | HEIGHT: 64 IN | HEART RATE: 85 BPM

## 2022-07-27 DIAGNOSIS — I10 HYPERTENSION, UNSPECIFIED TYPE: ICD-10-CM

## 2022-07-27 DIAGNOSIS — Z01.818 PREOPERATIVE TESTING: ICD-10-CM

## 2022-07-27 DIAGNOSIS — I48.0 PAROXYSMAL ATRIAL FIBRILLATION: ICD-10-CM

## 2022-07-27 DIAGNOSIS — E03.9 HYPOTHYROIDISM, UNSPECIFIED TYPE: ICD-10-CM

## 2022-07-27 DIAGNOSIS — Z01.818 PREOPERATIVE TESTING: Primary | ICD-10-CM

## 2022-07-27 DIAGNOSIS — L25.9 CONTACT DERMATITIS AND ECZEMA: ICD-10-CM

## 2022-07-27 LAB
APPEARANCE UR: CLEAR
BACTERIA #/AREA URNS AUTO: NORMAL /HPF
BILIRUB UR QL STRIP.AUTO: NEGATIVE MG/DL
COLOR UR AUTO: YELLOW
GLUCOSE UR QL STRIP.AUTO: NEGATIVE MG/DL
KETONES UR QL STRIP.AUTO: NEGATIVE MG/DL
LEUKOCYTE ESTERASE UR QL STRIP.AUTO: NEGATIVE UNIT/L
NITRITE UR QL STRIP.AUTO: NEGATIVE
PH UR STRIP.AUTO: 6 [PH]
PROT UR QL STRIP.AUTO: NEGATIVE MG/DL
RBC #/AREA URNS AUTO: <5 /HPF
RBC UR QL AUTO: NEGATIVE UNIT/L
SP GR UR STRIP.AUTO: 1.01 (ref 1–1.03)
SQUAMOUS #/AREA URNS AUTO: <5 /HPF
UROBILINOGEN UR STRIP-ACNC: 0.2 MG/DL
WBC #/AREA URNS AUTO: <5 /HPF

## 2022-07-27 PROCEDURE — 1159F PR MEDICATION LIST DOCUMENTED IN MEDICAL RECORD: ICD-10-PCS | Mod: CPTII,,, | Performed by: INTERNAL MEDICINE

## 2022-07-27 PROCEDURE — 1160F PR REVIEW ALL MEDS BY PRESCRIBER/CLIN PHARMACIST DOCUMENTED: ICD-10-PCS | Mod: CPTII,,, | Performed by: INTERNAL MEDICINE

## 2022-07-27 PROCEDURE — 1101F PT FALLS ASSESS-DOCD LE1/YR: CPT | Mod: CPTII,,, | Performed by: INTERNAL MEDICINE

## 2022-07-27 PROCEDURE — 4010F ACE/ARB THERAPY RXD/TAKEN: CPT | Mod: CPTII,,, | Performed by: INTERNAL MEDICINE

## 2022-07-27 PROCEDURE — 1160F RVW MEDS BY RX/DR IN RCRD: CPT | Mod: CPTII,,, | Performed by: INTERNAL MEDICINE

## 2022-07-27 PROCEDURE — 71046 X-RAY EXAM CHEST 2 VIEWS: CPT | Mod: TC

## 2022-07-27 PROCEDURE — 3078F PR MOST RECENT DIASTOLIC BLOOD PRESSURE < 80 MM HG: ICD-10-PCS | Mod: CPTII,,, | Performed by: INTERNAL MEDICINE

## 2022-07-27 PROCEDURE — 3075F PR MOST RECENT SYSTOLIC BLOOD PRESS GE 130-139MM HG: ICD-10-PCS | Mod: CPTII,,, | Performed by: INTERNAL MEDICINE

## 2022-07-27 PROCEDURE — 3008F BODY MASS INDEX DOCD: CPT | Mod: CPTII,,, | Performed by: INTERNAL MEDICINE

## 2022-07-27 PROCEDURE — 3008F PR BODY MASS INDEX (BMI) DOCUMENTED: ICD-10-PCS | Mod: CPTII,,, | Performed by: INTERNAL MEDICINE

## 2022-07-27 PROCEDURE — 1101F PR PT FALLS ASSESS DOC 0-1 FALLS W/OUT INJ PAST YR: ICD-10-PCS | Mod: CPTII,,, | Performed by: INTERNAL MEDICINE

## 2022-07-27 PROCEDURE — 3075F SYST BP GE 130 - 139MM HG: CPT | Mod: CPTII,,, | Performed by: INTERNAL MEDICINE

## 2022-07-27 PROCEDURE — 3078F DIAST BP <80 MM HG: CPT | Mod: CPTII,,, | Performed by: INTERNAL MEDICINE

## 2022-07-27 PROCEDURE — 4010F PR ACE/ARB THEARPY RXD/TAKEN: ICD-10-PCS | Mod: CPTII,,, | Performed by: INTERNAL MEDICINE

## 2022-07-27 PROCEDURE — 99214 PR OFFICE/OUTPT VISIT, EST, LEVL IV, 30-39 MIN: ICD-10-PCS | Mod: ,,, | Performed by: INTERNAL MEDICINE

## 2022-07-27 PROCEDURE — 3288F FALL RISK ASSESSMENT DOCD: CPT | Mod: CPTII,,, | Performed by: INTERNAL MEDICINE

## 2022-07-27 PROCEDURE — 99214 OFFICE O/P EST MOD 30 MIN: CPT | Mod: ,,, | Performed by: INTERNAL MEDICINE

## 2022-07-27 PROCEDURE — 1159F MED LIST DOCD IN RCRD: CPT | Mod: CPTII,,, | Performed by: INTERNAL MEDICINE

## 2022-07-27 PROCEDURE — 3288F PR FALLS RISK ASSESSMENT DOCUMENTED: ICD-10-PCS | Mod: CPTII,,, | Performed by: INTERNAL MEDICINE

## 2022-07-27 RX ORDER — PREDNISONE 10 MG/1
30 TABLET ORAL DAILY
Qty: 9 TABLET | Refills: 0 | Status: SHIPPED | OUTPATIENT
Start: 2022-07-27 | End: 2022-07-30

## 2022-07-27 NOTE — ASSESSMENT & PLAN NOTE
Cardiac clearance reviewed patient received full cardiac clearance from her cardiologist on July 26. He did advised okay to hold Xarelto but did not specify and length of time.  Will leave to surgeon discretion.  Chest x-ray and labs reviewed and are stable.  Patient reports that she is able to perform activities at a met level of more than 4 with no chest pain or shortness of breath  Revised cardiac risk index score of 0  No further testing needed for low risk patient for low risk procedure.

## 2022-07-27 NOTE — PROGRESS NOTES
Subjective:      Patient ID: Ngozi Webb is a 72 y.o. female.    Chief Complaint: Pre-op Exam      HPI:  72 year old female presents to clinic for preoperative exam  Hx of lumbar laminectomy 1983?  Kyphoplasty in 2014 after L4 fracture after sustaining a fall.  Has osteopenia.  Dr. 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 ER/VA + and HER-2 status unknown.  History of Barrets in 2006; Dr. Jean had EGD 10/2021   Has osteopenia.  C scope in [facility]  COVID vaccines UTD; had COVID July 10, 2022  Dr. Vu with [facility] 1/2022; 3/2022 with no improvement; she was referred to Dr. Lam and is here today for preoperative clearance for left L3/4 LLIF right L3/4 tubular decompression with MIS L3/4 PLIF on 08/16/2022.  She saw her cardiologist Dr. Padilla yesterday and received clearance she is on Xarelto for paroxysmal atrial fibrillation  She has not yet done her labs or chest x-ray      Problem List Items Addressed This Visit        Derm    Contact dermatitis and eczema       Cardiac/Vascular    Hypertension    Relevant Orders    CBC Auto Differential (Completed)    Comprehensive Metabolic Panel (Completed)    APTT (Completed)    Protime-INR (Completed)    ABO/Rh (Completed)    X-Ray Chest PA And Lateral (Completed)    Sedimentation rate (Completed)    Urinalysis, Reflex to Urine Culture Urine, Clean Catch (Completed)    Urinalysis, Microscopic (Completed)    Paroxysmal atrial fibrillation    Relevant Orders    CBC Auto Differential (Completed)    Comprehensive Metabolic Panel (Completed)    APTT (Completed)    Protime-INR (Completed)    ABO/Rh (Completed)    X-Ray Chest PA And Lateral (Completed)    Sedimentation rate (Completed)    Urinalysis, Reflex to Urine Culture Urine, Clean Catch (Completed)    Urinalysis, Microscopic (Completed)       Endocrine    Hypothyroidism    Overview     Formatting of this note might be different from the  "original.  ICD-10 Transition              Other    Preoperative testing - Primary    Current Assessment & Plan     Cardiac clearance reviewed patient received full cardiac clearance from her cardiologist on . He did advised okay to hold Xarelto but did not specify and length of time.  Will leave to surgeon discretion.  Chest x-ray and labs reviewed and are stable.  Patient reports that she is able to perform activities at a met level of more than 4 with no chest pain or shortness of breath  Revised cardiac risk index score of 0  No further testing needed for low risk patient for low risk procedure.           Relevant Orders    CBC Auto Differential (Completed)    Comprehensive Metabolic Panel (Completed)    APTT (Completed)    Protime-INR (Completed)    ABO/Rh (Completed)    X-Ray Chest PA And Lateral (Completed)    Sedimentation rate (Completed)    Urinalysis, Reflex to Urine Culture Urine, Clean Catch (Completed)    Urinalysis, Microscopic (Completed)              Past Medical History:  Past Medical History:   Diagnosis Date    DDD (degenerative disc disease), lumbar     GERD (gastroesophageal reflux disease)     Hypertension     Hyperthyroidism     Lumbar radiculopathy     Morbid obesity     Osteoarthritis of both knees     Paroxysmal atrial fibrillation      Past Surgical History:   Procedure Laterality Date    BILATERAL MASTECTOMY      per powercharts notes: 10/14/21     SECTION      CHOLECYSTECTOMY      COLONOSCOPY  10/04/2021    EGD not Colonoscopy     Review of patient's allergies indicates:   Allergen Reactions    Meperidine Nausea Only    Midazolam Nausea And Vomiting     "Makes her sick"       Current Outpatient Medications on File Prior to Visit   Medication Sig Dispense Refill    ALPRAZolam (XANAX) 0.25 MG tablet Take 0.25 mg by mouth as needed.      calcium carbonate (CALCIUM 600 ORAL) Take 1,200 mg by mouth Daily.      cetirizine (ZYRTEC) 10 MG tablet Take 10 mg by " mouth once daily.      cholecalciferol, vitamin D3, (VITAMIN D3) 50 mcg (2,000 unit) Cap capsule Take 5,000 Units by mouth Daily.      diltiaZEM (CARDIZEM CD) 120 MG Cp24 Take 120 mg by mouth once daily.      DULoxetine (CYMBALTA) 30 MG capsule Take 1 capsule by mouth nightly.      gabapentin (NEURONTIN) 100 MG capsule Take 1 capsule (100 mg total) by mouth 3 (three) times daily. 270 capsule 0    hydroCHLOROthiazide (HYDRODIURIL) 25 MG tablet TAKE 1 TABLET DAILY (REPLACES LISINOPRIL/HCTZ, CONTINUE LISINOPRIL 10 MG) 90 tablet 3    lisinopriL 10 MG tablet Take 10 mg by mouth once daily.      melatonin 3 mg Cap Take 3 mg by mouth nightly.      omeprazole (PRILOSEC) 20 MG capsule Take 20 mg by mouth 2 (two) times daily.      SYNTHROID 112 mcg tablet Take 112 mcg by mouth once daily.      XARELTO 20 mg Tab Take 20 mg by mouth once daily.       No current facility-administered medications on file prior to visit.     Social History     Socioeconomic History    Marital status:    Tobacco Use    Smoking status: Never Smoker    Smokeless tobacco: Never Used   Substance and Sexual Activity    Alcohol use: Not Currently    Drug use: Never    Sexual activity: Not Currently     Family History   Problem Relation Age of Onset    Uterine cancer Mother            Review of Systems  Constitutional: No fever,  no fatigue, no chills, no night sweats, no weight gain, no weight loss, no changes in appetite.   Eye: No redness, no acute vision loss, no blurred vision, no double vision, no eye pain  ENMT: No sore throat, no nasal drainage, no nose bleeds,  no headache, no ear pain, no ear drainage, no acute hearing loss  Respiratory: No cough, no sputum production, no shortness of breath, no hemoptysis, no wheezing.  Cardiovascular: No chest pain, no chest tightness, no BELL, no PND, no orthopnea, no swelling, no palpitations.  Gastrointestinal: No abdominal pain, no nausea, no vomiting, no diarrhea, no constipation,  "no difficulty swallowing, no change in bowel habits, no rectal bleeding  Genitourinary: no urgency, no frequency, no burning or pain when urinating, no blood in urine, no incontinence  Heme/Lymph: No easy bruising and/or bleeding, no swollen or painful glands.  Endocrine: No polyuria, no polydipsia, no polyphagia, no heat or cold intolerance.  Musculoskeletal: No muscle pain, no muscle weakness, no joint pain, no red or swollen joints.  Integumentary: No rash, no pruritis, no hair or nail changes.  Neurologic: No dizziness, no fainting, no tremors, no tingling and/ or numbness.  Psychiatric: No anxiety, no depression, no memory loss  All Other ROS: Negative with exception of what is documented in the history of present illness     Objective:   /78 (BP Location: Left arm, Patient Position: Sitting, BP Method: Medium (Manual))   Pulse 85   Temp 97.3 °F (36.3 °C) (Temporal)   Resp 16   Ht 5' 4" (1.626 m)   Wt 103.9 kg (229 lb)   SpO2 95%   BMI 39.31 kg/m²     Physical Exam  General : Alert and oriented, No acute distress, well, developed, well nourished, afebrile   Eye : PERRLA. EOMI. Normal conjunctiva without injection. Sclerae are nonicteric. No pallor.  HEENT : Normocephalic. Neck supple. Normal hearing. Oral mucosa is moist.  Respiratory : Lungs are clear to auscultation bilaterally, non-labored. Symmetrical chest wall expansion. No crackles, wheeze, or rhonci.  Cardiovascular : Normal rate, Regular rhythm. No murmurs, rubs, or gallops. Pulses 2+ in all extremities. No Edema.  Gastrointestinal : Soft, nontender, non-distended, bowel sounds normal, no organomegaly, no guarding, no rebound.  Musculoskeletal : Normal ROM.  No muscle tenderness.  Integumentary : Warm to touch. Intact. No rash.   contact dermatitis to the legs  Neurologic : Alert and oriented. No focal deficits  Psychiatric : Cooperative, Appropriate mood & affect. Normal judgment.          Assessment:     1. Preoperative testing    2. " Contact dermatitis and eczema    3. Hypothyroidism, unspecified type    4. Paroxysmal atrial fibrillation    5. Hypertension, unspecified type                  Plan:       I am having Ngozi Webb start on predniSONE. I am also having her maintain her calcium carbonate (CALCIUM 600 ORAL), melatonin, cholecalciferol (vitamin D3), ALPRAZolam, cetirizine, diltiaZEM, DULoxetine, SYNTHROID, lisinopriL, omeprazole, XARELTO, gabapentin, and hydroCHLOROthiazide.      Problem List Items Addressed This Visit        Derm    Contact dermatitis and eczema       Cardiac/Vascular    Hypertension    Relevant Orders    CBC Auto Differential (Completed)    Comprehensive Metabolic Panel (Completed)    APTT (Completed)    Protime-INR (Completed)    ABO/Rh (Completed)    X-Ray Chest PA And Lateral (Completed)    Sedimentation rate (Completed)    Urinalysis, Reflex to Urine Culture Urine, Clean Catch (Completed)    Urinalysis, Microscopic (Completed)    Paroxysmal atrial fibrillation    Relevant Orders    CBC Auto Differential (Completed)    Comprehensive Metabolic Panel (Completed)    APTT (Completed)    Protime-INR (Completed)    ABO/Rh (Completed)    X-Ray Chest PA And Lateral (Completed)    Sedimentation rate (Completed)    Urinalysis, Reflex to Urine Culture Urine, Clean Catch (Completed)    Urinalysis, Microscopic (Completed)       Endocrine    Hypothyroidism       Other    Preoperative testing - Primary     Cardiac clearance reviewed patient received full cardiac clearance from her cardiologist on July 26. He did advised okay to hold Xarelto but did not specify and length of time.  Will leave to surgeon discretion.  Chest x-ray and labs reviewed and are stable.  Patient reports that she is able to perform activities at a met level of more than 4 with no chest pain or shortness of breath  Revised cardiac risk index score of 0  No further testing needed for low risk patient for low risk procedure.           Relevant Orders     CBC Auto Differential (Completed)    Comprehensive Metabolic Panel (Completed)    APTT (Completed)    Protime-INR (Completed)    ABO/Rh (Completed)    X-Ray Chest PA And Lateral (Completed)    Sedimentation rate (Completed)    Urinalysis, Reflex to Urine Culture Urine, Clean Catch (Completed)    Urinalysis, Microscopic (Completed)            Ngozi was seen today for pre-op exam.    Diagnoses and all orders for this visit:    Preoperative testing  -     CBC Auto Differential; Future  -     Comprehensive Metabolic Panel; Future  -     APTT; Future  -     Protime-INR; Future  -     ABO/Rh; Future  -     X-Ray Chest PA And Lateral; Future  -     Sedimentation rate; Future  -     Urinalysis, Reflex to Urine Culture Urine, Clean Catch  -     Urinalysis, Microscopic    Contact dermatitis and eczema    Hypothyroidism, unspecified type    Paroxysmal atrial fibrillation  -     CBC Auto Differential; Future  -     Comprehensive Metabolic Panel; Future  -     APTT; Future  -     Protime-INR; Future  -     ABO/Rh; Future  -     X-Ray Chest PA And Lateral; Future  -     Sedimentation rate; Future  -     Urinalysis, Reflex to Urine Culture Urine, Clean Catch  -     Urinalysis, Microscopic    Hypertension, unspecified type  -     CBC Auto Differential; Future  -     Comprehensive Metabolic Panel; Future  -     APTT; Future  -     Protime-INR; Future  -     ABO/Rh; Future  -     X-Ray Chest PA And Lateral; Future  -     Sedimentation rate; Future  -     Urinalysis, Reflex to Urine Culture Urine, Clean Catch  -     Urinalysis, Microscopic    Other orders  -     predniSONE (DELTASONE) 10 MG tablet; Take 3 tablets (30 mg total) by mouth once daily. for 3 days            Medications Ordered This Encounter   Medications    predniSONE (DELTASONE) 10 MG tablet     Sig: Take 3 tablets (30 mg total) by mouth once daily. for 3 days     Dispense:  9 tablet     Refill:  0     [unfilled]  Orders Placed This Encounter   Procedures    X-Ray  Chest PA And Lateral     Standing Status:   Future     Number of Occurrences:   1     Standing Expiration Date:   7/27/2023     Order Specific Question:   May the Radiologist modify the order per protocol to meet the clinical needs of the patient?     Answer:   Yes     Order Specific Question:   Release to patient     Answer:   Immediate    CBC Auto Differential     Standing Status:   Future     Number of Occurrences:   1     Standing Expiration Date:   9/25/2023    Comprehensive Metabolic Panel     Standing Status:   Future     Number of Occurrences:   1     Standing Expiration Date:   9/25/2023    APTT     Standing Status:   Future     Number of Occurrences:   1     Standing Expiration Date:   9/25/2023    Protime-INR     Standing Status:   Future     Number of Occurrences:   1     Standing Expiration Date:   9/25/2023    Sedimentation rate     Standing Status:   Future     Number of Occurrences:   1     Standing Expiration Date:   9/25/2023    Urinalysis, Reflex to Urine Culture Urine, Clean Catch     Order Specific Question:   Preferred Collection Type     Answer:   Urine, Clean Catch     Order Specific Question:   Specimen Source     Answer:   Urine    Urinalysis, Microscopic     Order Specific Question:   Specimen Source     Answer:   Urine    ABO/Rh     Standing Status:   Future     Number of Occurrences:   1     Standing Expiration Date:   9/25/2023       Medication List with Changes/Refills   New Medications    PREDNISONE (DELTASONE) 10 MG TABLET    Take 3 tablets (30 mg total) by mouth once daily. for 3 days   Current Medications    ALPRAZOLAM (XANAX) 0.25 MG TABLET    Take 0.25 mg by mouth as needed.    CALCIUM CARBONATE (CALCIUM 600 ORAL)    Take 1,200 mg by mouth Daily.    CETIRIZINE (ZYRTEC) 10 MG TABLET    Take 10 mg by mouth once daily.    CHOLECALCIFEROL, VITAMIN D3, (VITAMIN D3) 50 MCG (2,000 UNIT) CAP CAPSULE    Take 5,000 Units by mouth Daily.    DILTIAZEM (CARDIZEM CD) 120 MG CP24     Take 120 mg by mouth once daily.    DULOXETINE (CYMBALTA) 30 MG CAPSULE    Take 1 capsule by mouth nightly.    GABAPENTIN (NEURONTIN) 100 MG CAPSULE    Take 1 capsule (100 mg total) by mouth 3 (three) times daily.    HYDROCHLOROTHIAZIDE (HYDRODIURIL) 25 MG TABLET    TAKE 1 TABLET DAILY (REPLACES LISINOPRIL/HCTZ, CONTINUE LISINOPRIL 10 MG)    LISINOPRIL 10 MG TABLET    Take 10 mg by mouth once daily.    MELATONIN 3 MG CAP    Take 3 mg by mouth nightly.    OMEPRAZOLE (PRILOSEC) 20 MG CAPSULE    Take 20 mg by mouth 2 (two) times daily.    SYNTHROID 112 MCG TABLET    Take 112 mcg by mouth once daily.    XARELTO 20 MG TAB    Take 20 mg by mouth once daily.   Discontinued Medications    GABAPENTIN (NEURONTIN) 100 MG CAPSULE    Take 1 capsule (100 mg total) by mouth 3 (three) times daily.      Medication List with Changes/Refills   New Medications    PREDNISONE (DELTASONE) 10 MG TABLET    Take 3 tablets (30 mg total) by mouth once daily. for 3 days       Start Date: 7/27/2022 End Date: 7/30/2022   Current Medications    ALPRAZOLAM (XANAX) 0.25 MG TABLET    Take 0.25 mg by mouth as needed.       Start Date: --        End Date: --    CALCIUM CARBONATE (CALCIUM 600 ORAL)    Take 1,200 mg by mouth Daily.       Start Date: --        End Date: --    CETIRIZINE (ZYRTEC) 10 MG TABLET    Take 10 mg by mouth once daily.       Start Date: 3/10/2022 End Date: --    CHOLECALCIFEROL, VITAMIN D3, (VITAMIN D3) 50 MCG (2,000 UNIT) CAP CAPSULE    Take 5,000 Units by mouth Daily.       Start Date: --        End Date: --    DILTIAZEM (CARDIZEM CD) 120 MG CP24    Take 120 mg by mouth once daily.       Start Date: 2/15/2022 End Date: --    DULOXETINE (CYMBALTA) 30 MG CAPSULE    Take 1 capsule by mouth nightly.       Start Date: 11/8/2021 End Date: --    GABAPENTIN (NEURONTIN) 100 MG CAPSULE    Take 1 capsule (100 mg total) by mouth 3 (three) times daily.       Start Date: 5/31/2022 End Date: 8/29/2022    HYDROCHLOROTHIAZIDE (HYDRODIURIL) 25 MG  TABLET    TAKE 1 TABLET DAILY (REPLACES LISINOPRIL/HCTZ, CONTINUE LISINOPRIL 10 MG)       Start Date: 6/14/2022 End Date: --    LISINOPRIL 10 MG TABLET    Take 10 mg by mouth once daily.       Start Date: 3/21/2022 End Date: --    MELATONIN 3 MG CAP    Take 3 mg by mouth nightly.       Start Date: --        End Date: --    OMEPRAZOLE (PRILOSEC) 20 MG CAPSULE    Take 20 mg by mouth 2 (two) times daily.       Start Date: 4/7/2022  End Date: --    SYNTHROID 112 MCG TABLET    Take 112 mcg by mouth once daily.       Start Date: 3/7/2022  End Date: --    XARELTO 20 MG TAB    Take 20 mg by mouth once daily.       Start Date: 2/7/2022  End Date: --   Discontinued Medications    GABAPENTIN (NEURONTIN) 100 MG CAPSULE    Take 1 capsule (100 mg total) by mouth 3 (three) times daily.       Start Date: 5/31/2022 End Date: 7/27/2022            No follow-ups on file.

## 2022-07-29 ENCOUNTER — TELEPHONE (OUTPATIENT)
Dept: INTERNAL MEDICINE | Facility: CLINIC | Age: 72
End: 2022-07-29
Payer: MEDICARE

## 2022-07-29 NOTE — TELEPHONE ENCOUNTER
----- Message from Azul Dumont sent at 7/29/2022  8:21 AM CDT -----  Regarding: Surgery Clearance  .Type:  Needs Medical Advice    [facility]  Symptoms (please be specific):    How long has patient had these symptoms:    Pharmacy name and phone #:    Would the patient rather a call back or a response via [facility]? Call back  Best Call Back Number: [phone]  Additional Information: Paperwork sent over to clinic does not actually clear pt for surgery, would like clinic to f/u, needs more docs on pt faxed over to [phone]

## 2022-07-29 NOTE — TELEPHONE ENCOUNTER
Spoke to Dr. Lam's Nurse and she stated that the box needs to be checked on the paper if pt is cleared or not cleared, and form needs to be signed.   Will fax over as soon as paperwork is filled out

## 2022-08-02 ENCOUNTER — TELEPHONE (OUTPATIENT)
Dept: INTERNAL MEDICINE | Facility: CLINIC | Age: 72
End: 2022-08-02
Payer: MEDICARE

## 2022-08-02 NOTE — TELEPHONE ENCOUNTER
----- Message from Antwon Knott MD sent at 8/2/2022  8:55 AM CDT -----  There was an ABO and Rh  The type and screen is only valid for 72 hours  Do we have a surgery date?    ----- Message -----  From: Uriel Nguyen MA  Sent: 8/2/2022   8:50 AM CDT  To: Antwon Knott MD    [facility], [facility], called wanting verify if we did the antibody type and screen.   Mis stated that she needs both the type and screen  Callback: [phone] ext [phone]

## 2022-08-02 NOTE — TELEPHONE ENCOUNTER
Can you confirm with the surgery staff that they want the type and screen now or do they want it closer to the surgery  date

## 2022-08-03 ENCOUNTER — PATIENT MESSAGE (OUTPATIENT)
Dept: INTERNAL MEDICINE | Facility: CLINIC | Age: 72
End: 2022-08-03
Payer: MEDICARE

## 2022-08-26 ENCOUNTER — PATIENT MESSAGE (OUTPATIENT)
Dept: INTERNAL MEDICINE | Facility: CLINIC | Age: 72
End: 2022-08-26
Payer: MEDICARE

## 2022-09-19 ENCOUNTER — PATIENT MESSAGE (OUTPATIENT)
Dept: INTERNAL MEDICINE | Facility: CLINIC | Age: 72
End: 2022-09-19
Payer: MEDICARE

## 2022-09-22 ENCOUNTER — HISTORICAL (OUTPATIENT)
Dept: ADMINISTRATIVE | Facility: HOSPITAL | Age: 72
End: 2022-09-22
Payer: MEDICARE

## 2022-10-25 ENCOUNTER — OFFICE VISIT (OUTPATIENT)
Dept: INTERNAL MEDICINE | Facility: CLINIC | Age: 72
End: 2022-10-25
Payer: MEDICARE

## 2022-10-25 VITALS
TEMPERATURE: 98 F | DIASTOLIC BLOOD PRESSURE: 78 MMHG | BODY MASS INDEX: 39.61 KG/M2 | SYSTOLIC BLOOD PRESSURE: 152 MMHG | RESPIRATION RATE: 16 BRPM | OXYGEN SATURATION: 95 % | HEIGHT: 64 IN | HEART RATE: 85 BPM | WEIGHT: 232 LBS

## 2022-10-25 DIAGNOSIS — Z23 NEED FOR VACCINATION: Primary | ICD-10-CM

## 2022-10-25 DIAGNOSIS — F41.9 ANXIETY: ICD-10-CM

## 2022-10-25 PROBLEM — R42 DIZZINESS: Status: ACTIVE | Noted: 2022-10-25

## 2022-10-25 PROCEDURE — 99214 OFFICE O/P EST MOD 30 MIN: CPT | Mod: ,,, | Performed by: INTERNAL MEDICINE

## 2022-10-25 PROCEDURE — 3077F PR MOST RECENT SYSTOLIC BLOOD PRESSURE >= 140 MM HG: ICD-10-PCS | Mod: CPTII,,, | Performed by: INTERNAL MEDICINE

## 2022-10-25 PROCEDURE — 90694 FLU VACCINE - QUADRIVALENT - ADJUVANTED: ICD-10-PCS | Mod: ,,, | Performed by: INTERNAL MEDICINE

## 2022-10-25 PROCEDURE — 3077F SYST BP >= 140 MM HG: CPT | Mod: CPTII,,, | Performed by: INTERNAL MEDICINE

## 2022-10-25 PROCEDURE — 4010F PR ACE/ARB THEARPY RXD/TAKEN: ICD-10-PCS | Mod: CPTII,,, | Performed by: INTERNAL MEDICINE

## 2022-10-25 PROCEDURE — G0008 FLU VACCINE - QUADRIVALENT - ADJUVANTED: ICD-10-PCS | Mod: ,,, | Performed by: INTERNAL MEDICINE

## 2022-10-25 PROCEDURE — G0008 ADMIN INFLUENZA VIRUS VAC: HCPCS | Mod: ,,, | Performed by: INTERNAL MEDICINE

## 2022-10-25 PROCEDURE — 99214 PR OFFICE/OUTPT VISIT, EST, LEVL IV, 30-39 MIN: ICD-10-PCS | Mod: ,,, | Performed by: INTERNAL MEDICINE

## 2022-10-25 PROCEDURE — 3078F DIAST BP <80 MM HG: CPT | Mod: CPTII,,, | Performed by: INTERNAL MEDICINE

## 2022-10-25 PROCEDURE — 1159F PR MEDICATION LIST DOCUMENTED IN MEDICAL RECORD: ICD-10-PCS | Mod: CPTII,,, | Performed by: INTERNAL MEDICINE

## 2022-10-25 PROCEDURE — 4010F ACE/ARB THERAPY RXD/TAKEN: CPT | Mod: CPTII,,, | Performed by: INTERNAL MEDICINE

## 2022-10-25 PROCEDURE — 3288F FALL RISK ASSESSMENT DOCD: CPT | Mod: CPTII,,, | Performed by: INTERNAL MEDICINE

## 2022-10-25 PROCEDURE — 90694 VACC AIIV4 NO PRSRV 0.5ML IM: CPT | Mod: ,,, | Performed by: INTERNAL MEDICINE

## 2022-10-25 PROCEDURE — 1101F PT FALLS ASSESS-DOCD LE1/YR: CPT | Mod: CPTII,,, | Performed by: INTERNAL MEDICINE

## 2022-10-25 PROCEDURE — 3288F PR FALLS RISK ASSESSMENT DOCUMENTED: ICD-10-PCS | Mod: CPTII,,, | Performed by: INTERNAL MEDICINE

## 2022-10-25 PROCEDURE — 1101F PR PT FALLS ASSESS DOC 0-1 FALLS W/OUT INJ PAST YR: ICD-10-PCS | Mod: CPTII,,, | Performed by: INTERNAL MEDICINE

## 2022-10-25 PROCEDURE — 3078F PR MOST RECENT DIASTOLIC BLOOD PRESSURE < 80 MM HG: ICD-10-PCS | Mod: CPTII,,, | Performed by: INTERNAL MEDICINE

## 2022-10-25 PROCEDURE — 1159F MED LIST DOCD IN RCRD: CPT | Mod: CPTII,,, | Performed by: INTERNAL MEDICINE

## 2022-10-25 RX ORDER — BUPROPION HYDROCHLORIDE 150 MG/1
150 TABLET ORAL DAILY
Qty: 14 TABLET | Refills: 0 | Status: SHIPPED | OUTPATIENT
Start: 2022-10-25 | End: 2023-01-30 | Stop reason: SDUPTHER

## 2022-10-25 RX ORDER — BUPROPION HYDROCHLORIDE 150 MG/1
150 TABLET ORAL DAILY
Qty: 90 TABLET | Refills: 3 | Status: SHIPPED | OUTPATIENT
Start: 2022-10-25 | End: 2022-12-06

## 2022-10-25 NOTE — ASSESSMENT & PLAN NOTE
Trial of Wellbutrin   Side effects discussed  Patient to call if any worsening of symptoms  Flu vaccine today  Recheck in 6 weeks

## 2022-10-25 NOTE — PROGRESS NOTES
"Subjective:      Patient ID: Ngozi Webb is a 72 y.o. female.    Chief Complaint: Medication Problem      HPI:  72 year old female  Hx of lumbar laminectomy ?  Kyphoplasty in  after L4 fracture after sustaining a fall.  Has osteopenia.  Dr. 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 ER/AR + and HER-2 status unknown.  History of Barrets in ; Dr. Jean had EGD 10/2021   Has osteopenia.  C scope in [facility]  S/P left L3/4 LLIF right L3/4 tubular decompression with MIS L3/4 PLIF (artifical disc)  She saw her cardiologist Dr. Padilla on Xarelto for paroxysmal atrial fibrillation  She has not yet done her labs or chest x-ray  Dizzy spells  Recently had back surgery  Numbess in her fingers, elevated blood pressure  152/78 HR 85 Sat 95  On 1/2 tramadol at night  100mg of gabapentin at night  Has anxiety; we gave her a RX for Cymblata but she never started taking it    Past Medical History:  Past Medical History:   Diagnosis Date    DDD (degenerative disc disease), lumbar     GERD (gastroesophageal reflux disease)     Hypertension     Hyperthyroidism     Lumbar radiculopathy     Morbid obesity     Osteoarthritis of both knees     Paroxysmal atrial fibrillation      Past Surgical History:   Procedure Laterality Date    BILATERAL MASTECTOMY      per powercharts notes: 10/14/21    BREAST SURGERY  1991     SECTION      CHOLECYSTECTOMY      COSMETIC SURGERY  1991    ESOPHAGOGASTRODUODENOSCOPY  10/04/2021    HYSTERECTOMY  1974    SPINE SURGERY  2022     Review of patient's allergies indicates:   Allergen Reactions    Meperidine Nausea Only    Midazolam Nausea And Vomiting     "Makes her sick"       Current Outpatient Medications on File Prior to Visit   Medication Sig Dispense Refill    calcium carbonate (CALCIUM 600 ORAL) Take 1,200 mg by mouth Daily.      cetirizine (ZYRTEC) 10 MG tablet Take 10 mg by mouth once daily.      " cholecalciferol, vitamin D3, (VITAMIN D3) 50 mcg (2,000 unit) Cap capsule Take 5,000 Units by mouth Daily.      diltiaZEM (CARDIZEM CD) 120 MG Cp24 Take 120 mg by mouth once daily.      hydroCHLOROthiazide (HYDRODIURIL) 25 MG tablet TAKE 1 TABLET DAILY (REPLACES LISINOPRIL/HCTZ, CONTINUE LISINOPRIL 10 MG) 90 tablet 3    lisinopriL 10 MG tablet Take 10 mg by mouth once daily.      melatonin 3 mg Cap Take 3 mg by mouth nightly.      omeprazole (PRILOSEC) 20 MG capsule Take 20 mg by mouth 2 (two) times daily.      SYNTHROID 112 mcg tablet Take 112 mcg by mouth once daily.      XARELTO 20 mg Tab Take 20 mg by mouth once daily.      gabapentin (NEURONTIN) 100 MG capsule Take 1 capsule (100 mg total) by mouth 3 (three) times daily. (Patient taking differently: Take 100 mg by mouth once daily.) 270 capsule 0    [DISCONTINUED] ALPRAZolam (XANAX) 0.25 MG tablet Take 0.25 mg by mouth as needed.      [DISCONTINUED] DULoxetine (CYMBALTA) 30 MG capsule Take 1 capsule by mouth nightly.       No current facility-administered medications on file prior to visit.     Social History     Socioeconomic History    Marital status:    Tobacco Use    Smoking status: Never    Smokeless tobacco: Never   Substance and Sexual Activity    Alcohol use: Never    Drug use: Never    Sexual activity: Yes     Partners: Male     Birth control/protection: None     Family History   Problem Relation Age of Onset    Uterine cancer Mother     Stroke Mother     Alcohol abuse Father     Early death Father          at age 44    Hypertension Father     Stroke Father     COPD Sister     Diabetes Sister     Depression Daughter     Depression Daughter        Review of Systems  A comprehensive review of systems was performed and was negative with exception of what is documented above.     Objective:   BP (!) 152/78 (BP Location: Left arm, Patient Position: Sitting, BP Method: Medium (Manual))   Pulse 85   Temp 97.7 °F (36.5 °C) (Temporal)   Resp 16    "Ht 5' 4" (1.626 m)   Wt 105.2 kg (232 lb)   SpO2 95%   BMI 39.82 kg/m²   Physical Exam  General : Alert and oriented, No acute distress, afebrile.  Eye : PERRLA. EOMI. Normal conjunctiva, Sclerae are nonicteric.   Integumentary : Warm, moist, intact.  Neurologic : Alert, Oriented  Psychiatric : Cooperative, Appropriate mood & affect.   Assessment/ Plan:   1. Anxiety  Assessment & Plan:  Trial of Wellbutrin   Side effects discussed  Patient to call if any worsening of symptoms  Flu vaccine today  Recheck in 6 weeks      Other orders  -     buPROPion (WELLBUTRIN XL) 150 MG TB24 tablet; Take 1 tablet (150 mg total) by mouth once daily.  Dispense: 90 tablet; Refill: 3  -     buPROPion (WELLBUTRIN XL) 150 MG TB24 tablet; Take 1 tablet (150 mg total) by mouth once daily.  Dispense: 14 tablet; Refill: 0           Follow up in about 6 weeks (around 12/6/2022).      "

## 2022-12-06 ENCOUNTER — OFFICE VISIT (OUTPATIENT)
Dept: INTERNAL MEDICINE | Facility: CLINIC | Age: 72
End: 2022-12-06
Payer: MEDICARE

## 2022-12-06 VITALS
BODY MASS INDEX: 37.56 KG/M2 | DIASTOLIC BLOOD PRESSURE: 84 MMHG | TEMPERATURE: 98 F | OXYGEN SATURATION: 99 % | SYSTOLIC BLOOD PRESSURE: 150 MMHG | HEIGHT: 64 IN | HEART RATE: 85 BPM | WEIGHT: 220 LBS | RESPIRATION RATE: 16 BRPM

## 2022-12-06 DIAGNOSIS — I48.0 PAROXYSMAL ATRIAL FIBRILLATION: ICD-10-CM

## 2022-12-06 DIAGNOSIS — Z86.39 H/O HYPERLIPIDEMIA: ICD-10-CM

## 2022-12-06 DIAGNOSIS — F41.9 ANXIETY: ICD-10-CM

## 2022-12-06 DIAGNOSIS — I10 HYPERTENSION, UNSPECIFIED TYPE: ICD-10-CM

## 2022-12-06 DIAGNOSIS — E03.9 HYPOTHYROIDISM, UNSPECIFIED TYPE: Primary | ICD-10-CM

## 2022-12-06 PROCEDURE — 1159F PR MEDICATION LIST DOCUMENTED IN MEDICAL RECORD: ICD-10-PCS | Mod: CPTII,,, | Performed by: INTERNAL MEDICINE

## 2022-12-06 PROCEDURE — 3079F PR MOST RECENT DIASTOLIC BLOOD PRESSURE 80-89 MM HG: ICD-10-PCS | Mod: CPTII,,, | Performed by: INTERNAL MEDICINE

## 2022-12-06 PROCEDURE — 1101F PT FALLS ASSESS-DOCD LE1/YR: CPT | Mod: CPTII,,, | Performed by: INTERNAL MEDICINE

## 2022-12-06 PROCEDURE — 3008F PR BODY MASS INDEX (BMI) DOCUMENTED: ICD-10-PCS | Mod: CPTII,,, | Performed by: INTERNAL MEDICINE

## 2022-12-06 PROCEDURE — 3077F PR MOST RECENT SYSTOLIC BLOOD PRESSURE >= 140 MM HG: ICD-10-PCS | Mod: CPTII,,, | Performed by: INTERNAL MEDICINE

## 2022-12-06 PROCEDURE — 3079F DIAST BP 80-89 MM HG: CPT | Mod: CPTII,,, | Performed by: INTERNAL MEDICINE

## 2022-12-06 PROCEDURE — 3288F PR FALLS RISK ASSESSMENT DOCUMENTED: ICD-10-PCS | Mod: CPTII,,, | Performed by: INTERNAL MEDICINE

## 2022-12-06 PROCEDURE — 1159F MED LIST DOCD IN RCRD: CPT | Mod: CPTII,,, | Performed by: INTERNAL MEDICINE

## 2022-12-06 PROCEDURE — 99214 OFFICE O/P EST MOD 30 MIN: CPT | Mod: ,,, | Performed by: INTERNAL MEDICINE

## 2022-12-06 PROCEDURE — 3008F BODY MASS INDEX DOCD: CPT | Mod: CPTII,,, | Performed by: INTERNAL MEDICINE

## 2022-12-06 PROCEDURE — 4010F ACE/ARB THERAPY RXD/TAKEN: CPT | Mod: CPTII,,, | Performed by: INTERNAL MEDICINE

## 2022-12-06 PROCEDURE — 1101F PR PT FALLS ASSESS DOC 0-1 FALLS W/OUT INJ PAST YR: ICD-10-PCS | Mod: CPTII,,, | Performed by: INTERNAL MEDICINE

## 2022-12-06 PROCEDURE — 4010F PR ACE/ARB THEARPY RXD/TAKEN: ICD-10-PCS | Mod: CPTII,,, | Performed by: INTERNAL MEDICINE

## 2022-12-06 PROCEDURE — 3288F FALL RISK ASSESSMENT DOCD: CPT | Mod: CPTII,,, | Performed by: INTERNAL MEDICINE

## 2022-12-06 PROCEDURE — 1160F RVW MEDS BY RX/DR IN RCRD: CPT | Mod: CPTII,,, | Performed by: INTERNAL MEDICINE

## 2022-12-06 PROCEDURE — 99214 PR OFFICE/OUTPT VISIT, EST, LEVL IV, 30-39 MIN: ICD-10-PCS | Mod: ,,, | Performed by: INTERNAL MEDICINE

## 2022-12-06 PROCEDURE — 1160F PR REVIEW ALL MEDS BY PRESCRIBER/CLIN PHARMACIST DOCUMENTED: ICD-10-PCS | Mod: CPTII,,, | Performed by: INTERNAL MEDICINE

## 2022-12-06 PROCEDURE — 3077F SYST BP >= 140 MM HG: CPT | Mod: CPTII,,, | Performed by: INTERNAL MEDICINE

## 2022-12-06 RX ORDER — TRAMADOL HYDROCHLORIDE 50 MG/1
25 TABLET ORAL NIGHTLY
COMMUNITY
Start: 2022-11-09 | End: 2023-06-07

## 2022-12-06 RX ORDER — TRAZODONE HYDROCHLORIDE 50 MG/1
50 TABLET ORAL NIGHTLY PRN
Qty: 90 TABLET | Refills: 3 | Status: SHIPPED | OUTPATIENT
Start: 2022-12-06 | End: 2023-06-07

## 2022-12-06 NOTE — PROGRESS NOTES
"Subjective:      Patient ID: Ngozi Webb is a 72 y.o. female.    Chief Complaint: Anxiety      HPI:  72 year old female  Hx of lumbar laminectomy ?  Kyphoplasty in  after L4 fracture after sustaining a fall.  Has osteopenia.  Dr. 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 ER/VT + and HER-2 status unknown.  History of Barrets in ; Dr. Jean had EGD 10/2021   Has osteopenia.  C scope in [facility]  S/P left L3/4 LLIF right L3/4 tubular decompression with MIS L3/4 PLIF (artifical disc)  She saw her cardiologist Dr. Padilla on Xarelto for paroxysmal atrial fibrillation  Recently had back surgery  We started her on Wellbutrin in October, she is down 12 lbs since that visit  Her mood is good   Muscle spasm In her legs at night; and it only happens.     Past Medical History:  Past Medical History:   Diagnosis Date    DDD (degenerative disc disease), lumbar     GERD (gastroesophageal reflux disease)     Hypertension     Hyperthyroidism     Lumbar radiculopathy     Morbid obesity     Osteoarthritis of both knees     Paroxysmal atrial fibrillation      Past Surgical History:   Procedure Laterality Date    BILATERAL MASTECTOMY      per powercharts notes: 10/14/21    BREAST SURGERY  1991     SECTION      CHOLECYSTECTOMY      COSMETIC SURGERY  1991    ESOPHAGOGASTRODUODENOSCOPY  10/04/2021    HYSTERECTOMY  1974    SPINE SURGERY  2022     Review of patient's allergies indicates:   Allergen Reactions    Meperidine Nausea Only    Midazolam Nausea And Vomiting     "Makes her sick"       Current Outpatient Medications on File Prior to Visit   Medication Sig Dispense Refill    buPROPion (WELLBUTRIN XL) 150 MG TB24 tablet Take 1 tablet (150 mg total) by mouth once daily. 14 tablet 0    calcium carbonate (CALCIUM 600 ORAL) Take 1,200 mg by mouth Daily.      cetirizine (ZYRTEC) 10 MG tablet Take 10 mg by mouth once daily.      " cholecalciferol, vitamin D3, (VITAMIN D3) 50 mcg (2,000 unit) Cap capsule Take 5,000 Units by mouth Daily.      diltiaZEM (CARDIZEM CD) 120 MG Cp24 Take 120 mg by mouth once daily.      gabapentin (NEURONTIN) 100 MG capsule Take 1 capsule (100 mg total) by mouth 3 (three) times daily. (Patient taking differently: Take 50 mg by mouth every other day.) 270 capsule 0    hydroCHLOROthiazide (HYDRODIURIL) 25 MG tablet TAKE 1 TABLET DAILY (REPLACES LISINOPRIL/HCTZ, CONTINUE LISINOPRIL 10 MG) 90 tablet 3    lisinopriL 10 MG tablet Take 10 mg by mouth once daily.      melatonin 3 mg Cap Take 3 mg by mouth nightly.      omeprazole (PRILOSEC) 20 MG capsule Take 20 mg by mouth 2 (two) times daily.      SYNTHROID 112 mcg tablet Take 112 mcg by mouth once daily.      traMADoL (ULTRAM) 50 mg tablet Take 25 mg by mouth nightly.      XARELTO 20 mg Tab Take 20 mg by mouth once daily.      [DISCONTINUED] buPROPion (WELLBUTRIN XL) 150 MG TB24 tablet Take 1 tablet (150 mg total) by mouth once daily. 90 tablet 3     No current facility-administered medications on file prior to visit.     Social History     Socioeconomic History    Marital status:    Tobacco Use    Smoking status: Never    Smokeless tobacco: Never   Substance and Sexual Activity    Alcohol use: Never    Drug use: Never    Sexual activity: Yes     Partners: Male     Birth control/protection: None     Family History   Problem Relation Age of Onset    Uterine cancer Mother     Stroke Mother     Alcohol abuse Father     Early death Father          at age 44    Hypertension Father     Stroke Father     COPD Sister     Diabetes Sister     Depression Daughter     Depression Daughter        Review of Systems  A comprehensive review of systems was performed and was negative with exception of what is documented above.     Objective:   BP (!) 150/84 (BP Location: Right arm, Patient Position: Sitting, BP Method: Medium (Manual))   Pulse 85   Temp 98 °F (36.7 °C)  "(Temporal)   Resp 16   Ht 5' 4" (1.626 m)   Wt 99.8 kg (220 lb)   SpO2 99%   BMI 37.76 kg/m²   Physical Exam  General : Alert and oriented, No acute distress, afebrile. Obese  Eye : PERRLA. EOMI. Normal conjunctiva, Sclerae are nonicteric.  Respiratory : Respirations are non-labored and clear to auscultation bilaterally. Symmetrical air entry bilaterally, no crackles, no wheezes, no rhonchi. No cyanosis, no clubbing.  Cardiovascular : Normal rate, Regular rhythm. No murmurs, rubs, or gallops. Pulses are 2+ throughout. No JVD. No Edema.  Gastrointestinal : Soft, nontender, non-distended, bowel sounds are present in all quadrants, no organomegaly, no guarding, no rebound.  Musculoskeletal : Normal range of motion throughout. No muscle tenderness.  Integumentary : Warm, moist, intact.  Neurologic : Alert, Oriented,  Psychiatric : Cooperative, Appropriate mood & affect.   Assessment/ Plan:   1. Hypothyroidism, unspecified type  Overview:  Formatting of this note might be different from the original.  ICD-10 Transition    Orders:  -     CBC Auto Differential; Future; Expected date: 06/06/2023  -     Comprehensive Metabolic Panel; Future; Expected date: 06/06/2023  -     Lipid Panel; Future; Expected date: 06/06/2023  -     TSH; Future; Expected date: 06/06/2023  -     Hemoglobin A1C; Future; Expected date: 06/06/2023  -     Urinalysis; Future; Expected date: 06/06/2023    2. Anxiety  Assessment & Plan:  Continue current management with Wellbutrin will be adding trazodone at bedtime.  RTC 6 months       3. Hypertension, unspecified type  -     CBC Auto Differential; Future; Expected date: 06/06/2023  -     Comprehensive Metabolic Panel; Future; Expected date: 06/06/2023  -     Lipid Panel; Future; Expected date: 06/06/2023  -     TSH; Future; Expected date: 06/06/2023  -     Hemoglobin A1C; Future; Expected date: 06/06/2023  -     Urinalysis; Future; Expected date: 06/06/2023    4. Paroxysmal atrial fibrillation  -   "   CBC Auto Differential; Future; Expected date: 06/06/2023  -     Comprehensive Metabolic Panel; Future; Expected date: 06/06/2023  -     Lipid Panel; Future; Expected date: 06/06/2023  -     TSH; Future; Expected date: 06/06/2023  -     Hemoglobin A1C; Future; Expected date: 06/06/2023  -     Urinalysis; Future; Expected date: 06/06/2023    5. H/O hyperlipidemia  -     Hemoglobin A1C; Future; Expected date: 06/06/2023    Other orders  -     traZODone (DESYREL) 50 MG tablet; Take 1 tablet (50 mg total) by mouth nightly as needed for Insomnia.  Dispense: 90 tablet; Refill: 3           Follow up in about 6 months (around 6/6/2023) for with labs prior to visit, WELLNESS.

## 2023-01-30 ENCOUNTER — PATIENT MESSAGE (OUTPATIENT)
Dept: INTERNAL MEDICINE | Facility: CLINIC | Age: 73
End: 2023-01-30
Payer: MEDICARE

## 2023-01-30 DIAGNOSIS — F41.9 ANXIETY: Primary | ICD-10-CM

## 2023-01-30 RX ORDER — BUPROPION HYDROCHLORIDE 150 MG/1
150 TABLET ORAL DAILY
Qty: 90 TABLET | Refills: 3 | Status: SHIPPED | OUTPATIENT
Start: 2023-01-30 | End: 2024-01-16

## 2023-03-06 ENCOUNTER — PATIENT MESSAGE (OUTPATIENT)
Dept: INTERNAL MEDICINE | Facility: CLINIC | Age: 73
End: 2023-03-06
Payer: MEDICARE

## 2023-05-09 ENCOUNTER — TELEPHONE (OUTPATIENT)
Dept: INTERNAL MEDICINE | Facility: CLINIC | Age: 73
End: 2023-05-09
Payer: MEDICARE

## 2023-05-09 ENCOUNTER — LAB VISIT (OUTPATIENT)
Dept: LAB | Facility: HOSPITAL | Age: 73
End: 2023-05-09
Attending: INTERNAL MEDICINE
Payer: MEDICARE

## 2023-05-09 DIAGNOSIS — R53.83 FATIGUE, UNSPECIFIED TYPE: Primary | ICD-10-CM

## 2023-05-09 DIAGNOSIS — G47.9 SLEEPING DIFFICULTIES: ICD-10-CM

## 2023-05-09 DIAGNOSIS — R53.83 TIREDNESS: ICD-10-CM

## 2023-05-09 DIAGNOSIS — R53.83 FATIGUE, UNSPECIFIED TYPE: ICD-10-CM

## 2023-05-09 LAB
T3FREE SERPL-MCNC: 2.48 PG/ML (ref 1.57–3.91)
T4 FREE SERPL-MCNC: 1.34 NG/DL (ref 0.7–1.48)
TSH SERPL-ACNC: 2.12 UIU/ML (ref 0.35–4.94)

## 2023-05-09 PROCEDURE — 36415 COLL VENOUS BLD VENIPUNCTURE: CPT

## 2023-05-09 PROCEDURE — 84443 ASSAY THYROID STIM HORMONE: CPT

## 2023-05-09 PROCEDURE — 84481 FREE ASSAY (FT-3): CPT

## 2023-05-09 PROCEDURE — 84439 ASSAY OF FREE THYROXINE: CPT

## 2023-05-09 NOTE — TELEPHONE ENCOUNTER
----- Message from Ketty Contreras sent at 5/9/2023 11:07 AM CDT -----  Regarding: lab  Caller is requesting to schedule their Lab appointment prior to annual appointment.  Order is not listed in EPIC.  Please enter order and contact patient to schedule.    Name of Caller:pt    Preferred Date and Time of Labs:  NOW    Date of EPP Appointment:    Where would they like the lab performed? [facility]    Would the patient rather a call back or a response via [facility]? C/b    Best Call Back Number:[phone]    Additional Information:pt would like to get lab tests done for thyroid.  Pt is having difficulty sleeping and thinks it is related to her thyroid

## 2023-05-31 ENCOUNTER — TELEPHONE (OUTPATIENT)
Dept: INTERNAL MEDICINE | Facility: CLINIC | Age: 73
End: 2023-05-31
Payer: MEDICARE

## 2023-05-31 ENCOUNTER — LAB VISIT (OUTPATIENT)
Dept: LAB | Facility: HOSPITAL | Age: 73
End: 2023-05-31
Attending: INTERNAL MEDICINE
Payer: MEDICARE

## 2023-05-31 DIAGNOSIS — E03.9 HYPOTHYROIDISM, UNSPECIFIED TYPE: ICD-10-CM

## 2023-05-31 DIAGNOSIS — I48.0 PAROXYSMAL ATRIAL FIBRILLATION: ICD-10-CM

## 2023-05-31 DIAGNOSIS — I10 HYPERTENSION, UNSPECIFIED TYPE: ICD-10-CM

## 2023-05-31 DIAGNOSIS — Z86.39 H/O HYPERLIPIDEMIA: ICD-10-CM

## 2023-05-31 LAB
ALBUMIN SERPL-MCNC: 3.8 G/DL (ref 3.4–4.8)
ALBUMIN/GLOB SERPL: 1.3 RATIO (ref 1.1–2)
ALP SERPL-CCNC: 77 UNIT/L (ref 40–150)
ALT SERPL-CCNC: 5 UNIT/L (ref 0–55)
APPEARANCE UR: CLEAR
AST SERPL-CCNC: 14 UNIT/L (ref 5–34)
BACTERIA #/AREA URNS AUTO: NORMAL /HPF
BASOPHILS # BLD AUTO: 0.05 X10(3)/MCL
BASOPHILS NFR BLD AUTO: 1 %
BILIRUB UR QL STRIP.AUTO: NEGATIVE MG/DL
BILIRUBIN DIRECT+TOT PNL SERPL-MCNC: 0.4 MG/DL
BUN SERPL-MCNC: 17 MG/DL (ref 9.8–20.1)
CALCIUM SERPL-MCNC: 9.1 MG/DL (ref 8.4–10.2)
CHLORIDE SERPL-SCNC: 104 MMOL/L (ref 98–107)
CHOLEST SERPL-MCNC: 224 MG/DL
CHOLEST/HDLC SERPL: 3 {RATIO} (ref 0–5)
CO2 SERPL-SCNC: 29 MMOL/L (ref 23–31)
COLOR UR: YELLOW
CREAT SERPL-MCNC: 0.86 MG/DL (ref 0.55–1.02)
EOSINOPHIL # BLD AUTO: 0.12 X10(3)/MCL (ref 0–0.9)
EOSINOPHIL NFR BLD AUTO: 2.4 %
ERYTHROCYTE [DISTWIDTH] IN BLOOD BY AUTOMATED COUNT: 12.3 % (ref 11.5–17)
EST. AVERAGE GLUCOSE BLD GHB EST-MCNC: 99.7 MG/DL
GFR SERPLBLD CREATININE-BSD FMLA CKD-EPI: >60 MLS/MIN/1.73/M2
GLOBULIN SER-MCNC: 3 GM/DL (ref 2.4–3.5)
GLUCOSE SERPL-MCNC: 99 MG/DL (ref 82–115)
GLUCOSE UR QL STRIP.AUTO: NEGATIVE MG/DL
HBA1C MFR BLD: 5.1 %
HCT VFR BLD AUTO: 36.1 % (ref 37–47)
HDLC SERPL-MCNC: 67 MG/DL (ref 35–60)
HGB BLD-MCNC: 11.9 G/DL (ref 12–16)
IMM GRANULOCYTES # BLD AUTO: 0.02 X10(3)/MCL (ref 0–0.04)
IMM GRANULOCYTES NFR BLD AUTO: 0.4 %
KETONES UR QL STRIP.AUTO: NEGATIVE MG/DL
LDLC SERPL CALC-MCNC: 148 MG/DL (ref 50–140)
LEUKOCYTE ESTERASE UR QL STRIP.AUTO: NEGATIVE UNIT/L
LYMPHOCYTES # BLD AUTO: 1.52 X10(3)/MCL (ref 0.6–4.6)
LYMPHOCYTES NFR BLD AUTO: 30.7 %
MCH RBC QN AUTO: 31.2 PG (ref 27–31)
MCHC RBC AUTO-ENTMCNC: 33 G/DL (ref 33–36)
MCV RBC AUTO: 94.8 FL (ref 80–94)
MONOCYTES # BLD AUTO: 0.4 X10(3)/MCL (ref 0.1–1.3)
MONOCYTES NFR BLD AUTO: 8.1 %
NEUTROPHILS # BLD AUTO: 2.84 X10(3)/MCL (ref 2.1–9.2)
NEUTROPHILS NFR BLD AUTO: 57.4 %
NITRITE UR QL STRIP.AUTO: NEGATIVE
NRBC BLD AUTO-RTO: 0 %
PH UR STRIP.AUTO: 7.5 [PH]
PLATELET # BLD AUTO: 263 X10(3)/MCL (ref 130–400)
PMV BLD AUTO: 9.1 FL (ref 7.4–10.4)
POTASSIUM SERPL-SCNC: 5.1 MMOL/L (ref 3.5–5.1)
PROT SERPL-MCNC: 6.8 GM/DL (ref 5.8–7.6)
PROT UR QL STRIP.AUTO: NEGATIVE MG/DL
RBC # BLD AUTO: 3.81 X10(6)/MCL (ref 4.2–5.4)
RBC #/AREA URNS AUTO: <5 /HPF
RBC UR QL AUTO: NEGATIVE UNIT/L
SODIUM SERPL-SCNC: 140 MMOL/L (ref 136–145)
SP GR UR STRIP.AUTO: 1.01 (ref 1–1.03)
SQUAMOUS #/AREA URNS AUTO: <5 /HPF
TRIGL SERPL-MCNC: 45 MG/DL (ref 37–140)
TSH SERPL-ACNC: 2.37 UIU/ML (ref 0.35–4.94)
UROBILINOGEN UR STRIP-ACNC: 0.2 MG/DL
VLDLC SERPL CALC-MCNC: 9 MG/DL
WBC # SPEC AUTO: 4.95 X10(3)/MCL (ref 4.5–11.5)
WBC #/AREA URNS AUTO: <5 /HPF

## 2023-05-31 PROCEDURE — 84443 ASSAY THYROID STIM HORMONE: CPT

## 2023-05-31 PROCEDURE — 36415 COLL VENOUS BLD VENIPUNCTURE: CPT

## 2023-05-31 PROCEDURE — 85025 COMPLETE CBC W/AUTO DIFF WBC: CPT

## 2023-05-31 PROCEDURE — 80053 COMPREHEN METABOLIC PANEL: CPT

## 2023-05-31 PROCEDURE — 83036 HEMOGLOBIN GLYCOSYLATED A1C: CPT

## 2023-05-31 PROCEDURE — 80061 LIPID PANEL: CPT

## 2023-05-31 NOTE — TELEPHONE ENCOUNTER
----- Message from Uriel Nguyen MA sent at 5/31/2023  9:33 AM CDT -----  Regarding: PV 6/7/23 @ 11:20 Dr. Cornejo  1. Are there any outstanding tasks in the patient's chart? Yes, fasting labs    2. Is there any documentation in the chart? No    3.Has patient been seen in an ER, Urgent care clinic, or been admitted since last visit?  If yes, When, where, and why    4. Has patient seen any other healthcare providers since last visit?  If yes, when, where, and why    5. Has patient had any bloodwork or XR done since last visit?    6. Is patient signed up for patient portal?

## 2023-06-05 DIAGNOSIS — I10 HYPERTENSION, UNSPECIFIED TYPE: Primary | ICD-10-CM

## 2023-06-05 RX ORDER — LISINOPRIL 10 MG/1
TABLET ORAL
Qty: 90 TABLET | Refills: 3 | Status: SHIPPED | OUTPATIENT
Start: 2023-06-05

## 2023-06-07 ENCOUNTER — OFFICE VISIT (OUTPATIENT)
Dept: INTERNAL MEDICINE | Facility: CLINIC | Age: 73
End: 2023-06-07
Payer: MEDICARE

## 2023-06-07 VITALS
TEMPERATURE: 97 F | WEIGHT: 213 LBS | HEART RATE: 73 BPM | OXYGEN SATURATION: 98 % | RESPIRATION RATE: 16 BRPM | DIASTOLIC BLOOD PRESSURE: 78 MMHG | HEIGHT: 64 IN | BODY MASS INDEX: 36.37 KG/M2 | SYSTOLIC BLOOD PRESSURE: 116 MMHG

## 2023-06-07 DIAGNOSIS — E03.9 HYPOTHYROIDISM, UNSPECIFIED TYPE: ICD-10-CM

## 2023-06-07 DIAGNOSIS — Z00.00 ANNUAL PHYSICAL EXAM: ICD-10-CM

## 2023-06-07 DIAGNOSIS — E78.49 OTHER HYPERLIPIDEMIA: ICD-10-CM

## 2023-06-07 DIAGNOSIS — E66.01 CLASS 2 SEVERE OBESITY DUE TO EXCESS CALORIES WITH SERIOUS COMORBIDITY AND BODY MASS INDEX (BMI) OF 36.0 TO 36.9 IN ADULT: ICD-10-CM

## 2023-06-07 DIAGNOSIS — I48.0 PAROXYSMAL ATRIAL FIBRILLATION: ICD-10-CM

## 2023-06-07 DIAGNOSIS — Z00.00 MEDICARE ANNUAL WELLNESS VISIT, SUBSEQUENT: Primary | ICD-10-CM

## 2023-06-07 DIAGNOSIS — I10 HYPERTENSION, UNSPECIFIED TYPE: ICD-10-CM

## 2023-06-07 DIAGNOSIS — Z78.0 POSTMENOPAUSAL STATE: ICD-10-CM

## 2023-06-07 DIAGNOSIS — Z12.31 SCREENING MAMMOGRAM FOR BREAST CANCER: ICD-10-CM

## 2023-06-07 PROBLEM — M54.16 LUMBAR RADICULOPATHY, RIGHT: Status: RESOLVED | Noted: 2022-05-24 | Resolved: 2023-06-07

## 2023-06-07 PROBLEM — L25.9 CONTACT DERMATITIS AND ECZEMA: Status: RESOLVED | Noted: 2022-07-27 | Resolved: 2023-06-07

## 2023-06-07 PROBLEM — Z01.818 PREOPERATIVE TESTING: Status: RESOLVED | Noted: 2022-07-27 | Resolved: 2023-06-07

## 2023-06-07 PROBLEM — E66.812 CLASS 2 SEVERE OBESITY DUE TO EXCESS CALORIES WITH SERIOUS COMORBIDITY IN ADULT: Status: ACTIVE | Noted: 2023-06-07

## 2023-06-07 PROBLEM — F41.9 ANXIETY: Status: RESOLVED | Noted: 2022-10-25 | Resolved: 2023-06-07

## 2023-06-07 PROBLEM — M51.369 DEGENERATION OF INTERVERTEBRAL DISC OF LUMBAR REGION: Status: RESOLVED | Noted: 2022-05-24 | Resolved: 2023-06-07

## 2023-06-07 PROBLEM — R42 DIZZINESS: Status: RESOLVED | Noted: 2022-10-25 | Resolved: 2023-06-07

## 2023-06-07 PROBLEM — M51.36 DEGENERATION OF INTERVERTEBRAL DISC OF LUMBAR REGION: Status: RESOLVED | Noted: 2022-05-24 | Resolved: 2023-06-07

## 2023-06-07 PROCEDURE — 1101F PT FALLS ASSESS-DOCD LE1/YR: CPT | Mod: CPTII,,, | Performed by: INTERNAL MEDICINE

## 2023-06-07 PROCEDURE — 1159F PR MEDICATION LIST DOCUMENTED IN MEDICAL RECORD: ICD-10-PCS | Mod: CPTII,,, | Performed by: INTERNAL MEDICINE

## 2023-06-07 PROCEDURE — 3078F DIAST BP <80 MM HG: CPT | Mod: CPTII,,, | Performed by: INTERNAL MEDICINE

## 2023-06-07 PROCEDURE — 3288F FALL RISK ASSESSMENT DOCD: CPT | Mod: CPTII,,, | Performed by: INTERNAL MEDICINE

## 2023-06-07 PROCEDURE — 3288F PR FALLS RISK ASSESSMENT DOCUMENTED: ICD-10-PCS | Mod: CPTII,,, | Performed by: INTERNAL MEDICINE

## 2023-06-07 PROCEDURE — 1159F MED LIST DOCD IN RCRD: CPT | Mod: CPTII,,, | Performed by: INTERNAL MEDICINE

## 2023-06-07 PROCEDURE — 3078F PR MOST RECENT DIASTOLIC BLOOD PRESSURE < 80 MM HG: ICD-10-PCS | Mod: CPTII,,, | Performed by: INTERNAL MEDICINE

## 2023-06-07 PROCEDURE — G0439 PPPS, SUBSEQ VISIT: HCPCS | Mod: ,,, | Performed by: INTERNAL MEDICINE

## 2023-06-07 PROCEDURE — G0439 PR MEDICARE ANNUAL WELLNESS SUBSEQUENT VISIT: ICD-10-PCS | Mod: ,,, | Performed by: INTERNAL MEDICINE

## 2023-06-07 PROCEDURE — 1160F PR REVIEW ALL MEDS BY PRESCRIBER/CLIN PHARMACIST DOCUMENTED: ICD-10-PCS | Mod: CPTII,,, | Performed by: INTERNAL MEDICINE

## 2023-06-07 PROCEDURE — 4010F PR ACE/ARB THEARPY RXD/TAKEN: ICD-10-PCS | Mod: CPTII,,, | Performed by: INTERNAL MEDICINE

## 2023-06-07 PROCEDURE — 1160F RVW MEDS BY RX/DR IN RCRD: CPT | Mod: CPTII,,, | Performed by: INTERNAL MEDICINE

## 2023-06-07 PROCEDURE — 4010F ACE/ARB THERAPY RXD/TAKEN: CPT | Mod: CPTII,,, | Performed by: INTERNAL MEDICINE

## 2023-06-07 PROCEDURE — 1101F PR PT FALLS ASSESS DOC 0-1 FALLS W/OUT INJ PAST YR: ICD-10-PCS | Mod: CPTII,,, | Performed by: INTERNAL MEDICINE

## 2023-06-07 PROCEDURE — 3008F BODY MASS INDEX DOCD: CPT | Mod: CPTII,,, | Performed by: INTERNAL MEDICINE

## 2023-06-07 PROCEDURE — 3074F PR MOST RECENT SYSTOLIC BLOOD PRESSURE < 130 MM HG: ICD-10-PCS | Mod: CPTII,,, | Performed by: INTERNAL MEDICINE

## 2023-06-07 PROCEDURE — 3008F PR BODY MASS INDEX (BMI) DOCUMENTED: ICD-10-PCS | Mod: CPTII,,, | Performed by: INTERNAL MEDICINE

## 2023-06-07 PROCEDURE — 3074F SYST BP LT 130 MM HG: CPT | Mod: CPTII,,, | Performed by: INTERNAL MEDICINE

## 2023-06-07 RX ORDER — MELATONIN 3 MG
6 CAPSULE ORAL NIGHTLY
Start: 2023-06-07

## 2023-06-07 NOTE — PROGRESS NOTES
"  Patient ID: [id number]     Chief Complaint: Medicare AWV      HPI:     Ngozi Webb is a 73 y.o. female here today for a Medicare Wellness.   Hx of lumbar laminectomy ?  Kyphoplasty in  after L4 fracture after sustaining a fall.  Has osteopenia.  Dr. 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 ER/RI + and HER-2 status unknown.  History of Barrets in ; Dr. Jean had EGD 10/2021   Has osteopenia.  C scope in [facility]  S/P left L3/4 LLIF right L3/4 tubular decompression with MIS L3/4 PLIF (artifical disc)  She saw her cardiologist Dr. Padilla on Xarelto for paroxysmal atrial fibrillation  Muscle spasm In her legs at [facility]; and it only happens.     Opioid Screening: Patient medication list reviewed, patient is not taking prescription opioids. Patient is not using additional opioids than prescribed. Patient is at low risk of substance abuse based on this opioid use history.       ----------------------------  DDD (degenerative disc disease), lumbar  GERD (gastroesophageal reflux disease)  Hypertension  Hyperthyroidism  Lumbar radiculopathy  Morbid obesity  Osteoarthritis of both knees  Paroxysmal atrial fibrillation     Past Surgical History:   Procedure Laterality Date    BILATERAL MASTECTOMY      per powercharts notes: 10/14/21    BREAST SURGERY  1991     SECTION      CHOLECYSTECTOMY      COSMETIC SURGERY  1991    ESOPHAGOGASTRODUODENOSCOPY  10/04/2021    HYSTERECTOMY  1974    SPINE SURGERY  2022       Review of patient's allergies indicates:   Allergen Reactions    Meperidine Nausea Only    Midazolam Nausea And Vomiting     "Makes her sick"         Outpatient Medications Marked as Taking for the 23 encounter (Office Visit) with Antwon Knott MD   Medication Sig Dispense Refill    buPROPion (WELLBUTRIN XL) 150 MG TB24 tablet Take 1 tablet (150 mg total) by mouth once daily. 90 tablet 3    calcium carbonate " (CALCIUM 600 ORAL) Take 1,200 mg by mouth Daily.      cetirizine (ZYRTEC) 10 MG tablet Take 10 mg by mouth once daily.      cholecalciferol, vitamin D3, (VITAMIN D3) 50 mcg (2,000 unit) Cap capsule Take 5,000 Units by mouth Daily.      diltiaZEM (CARDIZEM CD) 120 MG Cp24 Take 120 mg by mouth once daily.      hydroCHLOROthiazide (HYDRODIURIL) 25 MG tablet TAKE 1 TABLET DAILY (REPLACES LISINOPRIL/HCTZ, CONTINUE LISINOPRIL 10 MG) 90 tablet 3    levothyroxine (SYNTHROID) 112 MCG tablet TAKE 1 TABLET DAILY 90 tablet 3    lisinopriL 10 MG tablet TAKE 1 TABLET DAILY 90 tablet 3    omeprazole (PRILOSEC) 20 MG capsule Take 20 mg by mouth 2 (two) times daily.      XARELTO 20 mg Tab Take 20 mg by mouth once daily.      [DISCONTINUED] melatonin 3 mg Cap Take 3 mg by mouth nightly.      [DISCONTINUED] traMADoL (ULTRAM) 50 mg tablet Take 25 mg by mouth nightly.      [DISCONTINUED] traZODone (DESYREL) 50 MG tablet Take 1 tablet (50 mg total) by mouth nightly as needed for Insomnia. 90 tablet 3       Social History     Socioeconomic History    Marital status:    Tobacco Use    Smoking status: Never    Smokeless tobacco: Never   Substance and Sexual Activity    Alcohol use: Never    Drug use: Never    Sexual activity: Yes     Partners: Male     Birth control/protection: None        Family History   Problem Relation Age of Onset    Uterine cancer Mother     Stroke Mother     Alcohol abuse Father     Early death Father          at age 44    Hypertension Father     Stroke Father     COPD Sister     Diabetes Sister     Depression Daughter     Depression Daughter         Patient Care Team:  Antwon Knott MD as PCP - General (Internal Medicine)  Ant Jean MD as Consulting Physician (Gastroenterology)       Subjective:     ROS  Constitutional: No fever, no chills, no night sweats, no changes in weight, no changes in appetite.  Eye: No blurring of vision, no double vision, no conjunctival injection, no acute  vision loss  ENMT: No trauma, No sore throat, no rhinorrhea, no tinnitus, no headache, no vertigo, no ear pain or discharge  Respiratory: No cough, no sputum production, no shortness of breath, no hemoptysis, no wheezing.  Cardiovascular: No chest pain, no BELL, no PND, no orthopnea, no edema, no palpitations.  Gastrointestinal: No abdominal pain, nausea, no vomiting, no changes in frequency or consistency of stools, no diarrhea, no constipation, no BRBPR.  Genitourinary: No dysuria, no hematuria, no foul-smelling urine, no straining to urinate, no increase in frequency  Heme/Lymph: No easy bruising/ bleeding, no swollen or painful glands.  Endocrine: No polyuria, no polydipsia, no polyphagia, no heat or cold intolerance.  Musculoskeletal: No muscle pain, no muscle weakness, no joint pain, no difficulties on reference to range of motion.  Integumentary: No rash, no pruritis.  Neurologic: No sensory deficit, no motor deficit, no headache, no neck rigidity, no paresthesias, no syncope.  Psychiatric: no mood changes, no anxiety, no depression, no tension, no memory defecits  All Other ROS: Negative with exception of what is documented in the history of present illness     Patient Reported Health Risk Assessment  What [age]  Are you male or female?: Female  During the past four weeks, how much have you been bothered by emotional problems such as feeling anxious, depressed, irritable, sad, or downhearted and blue?: Not at all  During the past five weeks, has your physical and/or emotional health limited your social activities with family, friends, neighbors, or groups?: Not at all  During the past four weeks, how much bodily pain have you generally had?: Very mild pain  During the past four weeks, was someone available to help if you needed and wanted help?: Yes, quite a bit  During the past four weeks, what was the hardest physical activity you could do for at least two minutes?: Moderate  Can you get to  places out of walking distance without help?  (For example, can you travel alone on buses or taxis, or drive your own car?): Yes  Can you go shopping for groceries or clothes without someone's help?: Yes  Can you prepare your own meals?: Yes  Can you do your own housework without help?: Yes  Because of any health problems, do you need the help of another person with your personal care needs such as eating, bathing, dressing, or getting around the house?: No  Can you handle your own money without help?: Yes  During the past four weeks, how would you rate your health in general?: Very good  How have things been going for you during the past four weeks?: Pretty well  Are you having difficulties driving your car?: No  Do you always fasten your seat belt when you are in a car?: Yes, usually  How often in the past four weeks have you been bothered by falling or dizzy when standing up?: Never  How often in the past four weeks have you been bothered by sexual problems?: Never  How often in the past four weeks have you been bothered by trouble eating well?: Never  How often in the past four weeks have you been bothered by teeth or denture problems?: Never  How often in the past four weeks have you been bothered with problems using the telephone?: Never  How often in the past four weeks have you been bothered by tiredness or fatigue?: Never  Have you fallen two or more times in the past year?: No  Are you afraid of falling?: No  Are you a smoker?: No  During the past four weeks, how many drinks of wine, beer, or other alcoholic beverages did you have?: One drink or less per week  Do you exercise for about 20 minutes three or more days a week?: Yes, some of the time  Have you been given any information to help you with hazards in your house that might hurt you?: Yes  Have you been given any information to help you with keeping track of your medications?: Yes  How often do you have trouble taking medicines the way you've been  "told to take them?: I always take them as prescribed  How confident are you that you can control and manage most of your health problems?: Very confident  What is your race? (Check all that apply.):     Objective:     /78 (BP Location: Right arm, Patient Position: Sitting, BP Method: Medium (Manual))   Pulse 73   Temp 97.3 °F (36.3 °C) (Temporal)   Resp 16   Ht 5' 4" (1.626 m)   Wt 96.6 kg (213 lb)   SpO2 98%   BMI 36.56 kg/m²     Physical Exam  General : Alert and oriented, No acute distress, afebrile.  Eye : PERRLA. EOMI. Normal conjunctiva, Sclerae are nonicteric.   Respiratory : Respirations are non-labored and clear to auscultation bilaterally. Symmetrical air entry bilaterally, no crackles, no wheezes, no rhonchi. No cyanosis, no clubbing.  Cardiovascular : Normal rate, Regular rhythm. No murmurs, rubs, or gallops. Pulses are 2+ throughout. No JVD. No Edema.  Gastrointestinal : Soft, nontender, non-distended, bowel sounds are present in all quadrants, no organomegaly, no guarding, no rebound.  Musculoskeletal : Normal range of motion throughout. No muscle tenderness.  Integumentary : Warm, moist, intact.  Neurologic : Alert, Oriented  Psychiatric : Cooperative, Appropriate mood & affect.     No flowsheet data found.  Fall Risk Assessment - Outpatient 6/7/2023 12/6/2022 10/25/2022 7/27/2022 5/24/2022   Mobility Status Ambulatory Ambulatory Ambulatory Ambulatory Ambulatory   Number of falls 0 0 0 0 1   Identified as fall risk 0 0 0 0 0           Depression Screening  Over the past two weeks, has the patient felt down, depressed, or hopeless?: No  Over the past two weeks, has the patient felt little interest or pleasure in doing things?: No  Functional Ability/Safety Screening  Was the patient's timed Up & Go test unsteady or longer than 30 seconds?: No  Does the patient need help with phone, transportation, shopping, preparing meals, housework, laundry, meds, or managing money?: No  Does " the patient's home have rugs in the hallway, lack grab bars in the bathroom, lack handrails on the stairs or have poor lighting?: No  Have you noticed any hearing difficulties?: No  Cognitive Function (Assessed through direct observation with due consideration of information obtained by way of patient reports and/or concerns raised by family, friends, caretakers, or others)    Does the patient repeat questions/statements in the same day?: No  Does the patient have trouble remembering the date, year, and time?: No  Does the patient have difficulty managing finances?: No  Does the patient have a decreased sense of direction?: No  Assessment/Plan:     1. Medicare annual wellness visit, subsequent  Assessment & Plan:  General health maintenance education given  History of vasectomy so no longer needs mammograms  Up-to-date with bone density, request records she had a done with the neurosurgeon's office in [place]  Remainder of age-appropriate exams are up-to-date      2. Postmenopausal state  -     Cancel: DXA Bone Density Axial Skeleton 1 or more sites; Future; Expected date: 06/07/2023    3. Screening mammogram for breast cancer  -     Cancel: Mammo Digital Screening Bilat; Future; Expected date: 06/07/2023    4. Annual physical exam  Assessment & Plan:  General health maint education given  Labs reviewed and excellent  Age appropriate exams UTD      5. Hypothyroidism, unspecified type  Overview:  Formatting of this note might be different from the original.  ICD-10 Transition      6. Paroxysmal atrial fibrillation    7. Hypertension, unspecified type  Assessment & Plan:  Well controlled.   Continue current med management with Lisinopril  Low Sodium Diet (DASH Diet - Less than 2 grams of sodium per day).  Monitor blood pressure daily and log. Report consistent numbers greater than 140/90.  Maintain healthy weight with goal BMI <30. Exercise 30 minutes per day, 5 days per week.  Smoking cessation encouraged to aid  in BP reduction.      8. Class 2 severe obesity due to excess calories with serious comorbidity and body mass index (BMI) of 36.0 to 36.9 in adult    9. Other hyperlipidemia  Assessment & Plan:  Encourage patient to increase intake of dietary fiber, start red yeast rice 600 mg twice a day as well as a good fish oil and will recheck her cholesterol again in 6 months I think her CT calcium score was around 13 so her goal for LDL should be less than 70      Other orders  -     melatonin 3 mg Cap; Take 6 mg by mouth nightly.           Medicare Annual Wellness and Personalized Prevention Plan:   Fall Risk + Home Safety + Hearing Impairment + Depression Screen + Opioid and Substance Abuse Screening + Cognitive Impairment Screen + Health Risk Assessment all reviewed.     Health Maintenance Topics with due status: Not Due       Topic Last Completion Date    Colorectal Cancer Screening 10/04/2021    Lipid Panel 05/31/2023      The patient's Health Maintenance was reviewed and the following appears to be due at this time:   Health Maintenance Due   Topic Date Due    Hepatitis C Screening  Never done    TETANUS VACCINE  Never done    DEXA Scan  Never done    COVID-19 Vaccine (3 - Pfizer series) 04/09/2021       Advance Care Planning   I attest to discussing Advance Care Planning with patient and/or family member.  Education was provided including the importance of the Health Care Power of , Advance Directives, and/or [place] documentation.  The patient expressed understanding to the importance of this information and discussion.         Follow up in about 6 months (around 12/7/2023) for with labs prior to visit, BP CHECK, HLD. In addition to their scheduled follow up, the patient has also been instructed to follow up on as needed basis.

## 2023-06-07 NOTE — ASSESSMENT & PLAN NOTE
Encourage patient to increase intake of dietary fiber, start red yeast rice 600 mg twice a day as well as a good fish oil and will recheck her cholesterol again in 6 months I think her CT calcium score was around 13 so her goal for LDL should be less than 70  
General health maint education given  Labs reviewed and excellent  Age appropriate exams UTD  
General health maintenance education given  History of vasectomy so no longer needs mammograms  Up-to-date with bone density, request records she had a done with the neurosurgeon's office in [place]  Remainder of age-appropriate exams are up-to-date  
Well controlled.   Continue current med management with Lisinopril  Low Sodium Diet (DASH Diet - Less than 2 grams of sodium per day).  Monitor blood pressure daily and log. Report consistent numbers greater than 140/90.  Maintain healthy weight with goal BMI <30. Exercise 30 minutes per day, 5 days per week.  Smoking cessation encouraged to aid in BP reduction.  
Unknown if ever smoked

## 2023-09-11 PROBLEM — Z00.00 MEDICARE ANNUAL WELLNESS VISIT, SUBSEQUENT: Status: RESOLVED | Noted: 2023-06-07 | Resolved: 2023-09-11

## 2023-11-20 ENCOUNTER — CLINICAL SUPPORT (OUTPATIENT)
Dept: INTERNAL MEDICINE | Facility: CLINIC | Age: 73
End: 2023-11-20
Payer: MEDICARE

## 2023-11-20 DIAGNOSIS — Z23 NEED FOR VACCINATION: Primary | ICD-10-CM

## 2023-11-20 PROCEDURE — 90694 FLU VACCINE - QUADRIVALENT - ADJUVANTED: ICD-10-PCS | Mod: ,,, | Performed by: INTERNAL MEDICINE

## 2023-11-20 PROCEDURE — 90694 VACC AIIV4 NO PRSRV 0.5ML IM: CPT | Mod: ,,, | Performed by: INTERNAL MEDICINE

## 2023-11-20 PROCEDURE — G0008 FLU VACCINE - QUADRIVALENT - ADJUVANTED: ICD-10-PCS | Mod: ,,, | Performed by: INTERNAL MEDICINE

## 2023-11-20 PROCEDURE — G0008 ADMIN INFLUENZA VIRUS VAC: HCPCS | Mod: ,,, | Performed by: INTERNAL MEDICINE

## 2023-11-27 ENCOUNTER — TELEPHONE (OUTPATIENT)
Dept: INTERNAL MEDICINE | Facility: CLINIC | Age: 73
End: 2023-11-27
Payer: MEDICARE

## 2023-11-27 DIAGNOSIS — I10 PRIMARY HYPERTENSION: ICD-10-CM

## 2023-11-27 DIAGNOSIS — Z86.39 H/O HYPERLIPIDEMIA: Primary | ICD-10-CM

## 2023-11-27 NOTE — TELEPHONE ENCOUNTER
----- Message from Uriel Nguyen MA sent at 11/27/2023 11:37 AM CST -----  Regarding: WING 12/1/23 @ 11:20 Dr. Cornejo  1. Are there any outstanding tasks in the patient's chart? Yes, fasting labs    2. Is there any documentation in the chart? No    3.Has patient been seen in an ER, Urgent care clinic, or been admitted since last visit?  If yes, When, where, and why    4. Has patient seen any other healthcare providers since last visit?  If yes, when, where, and why    5. Has patient had any bloodwork or XR done since last visit?    6. Is patient signed up for patient portal?

## 2023-12-06 ENCOUNTER — LAB VISIT (OUTPATIENT)
Dept: LAB | Facility: HOSPITAL | Age: 73
End: 2023-12-06
Attending: INTERNAL MEDICINE
Payer: MEDICARE

## 2023-12-06 ENCOUNTER — TELEPHONE (OUTPATIENT)
Dept: INTERNAL MEDICINE | Facility: CLINIC | Age: 73
End: 2023-12-06
Payer: MEDICARE

## 2023-12-06 DIAGNOSIS — E03.9 HYPOTHYROIDISM, UNSPECIFIED TYPE: ICD-10-CM

## 2023-12-06 DIAGNOSIS — I10 PRIMARY HYPERTENSION: ICD-10-CM

## 2023-12-06 DIAGNOSIS — E03.9 HYPOTHYROIDISM, UNSPECIFIED TYPE: Primary | ICD-10-CM

## 2023-12-06 DIAGNOSIS — Z86.39 H/O HYPERLIPIDEMIA: ICD-10-CM

## 2023-12-06 LAB
ALBUMIN SERPL-MCNC: 3.8 G/DL (ref 3.4–4.8)
ALBUMIN/GLOB SERPL: 1.2 RATIO (ref 1.1–2)
ALP SERPL-CCNC: 86 UNIT/L (ref 40–150)
ALT SERPL-CCNC: 7 UNIT/L (ref 0–55)
APPEARANCE UR: CLEAR
AST SERPL-CCNC: 14 UNIT/L (ref 5–34)
BACTERIA #/AREA URNS AUTO: ABNORMAL /HPF
BASOPHILS # BLD AUTO: 0.05 X10(3)/MCL
BASOPHILS NFR BLD AUTO: 0.9 %
BILIRUB SERPL-MCNC: 0.4 MG/DL
BILIRUB UR QL STRIP.AUTO: NEGATIVE
BUN SERPL-MCNC: 17.3 MG/DL (ref 9.8–20.1)
CALCIUM SERPL-MCNC: 9.5 MG/DL (ref 8.4–10.2)
CHLORIDE SERPL-SCNC: 108 MMOL/L (ref 98–107)
CHOLEST SERPL-MCNC: 234 MG/DL
CHOLEST/HDLC SERPL: 3 {RATIO} (ref 0–5)
CO2 SERPL-SCNC: 31 MMOL/L (ref 23–31)
COLOR UR AUTO: YELLOW
CREAT SERPL-MCNC: 0.86 MG/DL (ref 0.55–1.02)
EOSINOPHIL # BLD AUTO: 0.23 X10(3)/MCL (ref 0–0.9)
EOSINOPHIL NFR BLD AUTO: 4 %
ERYTHROCYTE [DISTWIDTH] IN BLOOD BY AUTOMATED COUNT: 12.5 % (ref 11.5–17)
GFR SERPLBLD CREATININE-BSD FMLA CKD-EPI: >60 MLS/MIN/1.73/M2
GLOBULIN SER-MCNC: 3.2 GM/DL (ref 2.4–3.5)
GLUCOSE SERPL-MCNC: 92 MG/DL (ref 82–115)
GLUCOSE UR QL STRIP.AUTO: NORMAL
HCT VFR BLD AUTO: 36.2 % (ref 37–47)
HDLC SERPL-MCNC: 68 MG/DL (ref 35–60)
HGB BLD-MCNC: 11.9 G/DL (ref 12–16)
IMM GRANULOCYTES # BLD AUTO: 0.02 X10(3)/MCL (ref 0–0.04)
IMM GRANULOCYTES NFR BLD AUTO: 0.3 %
KETONES UR QL STRIP.AUTO: NEGATIVE
LDLC SERPL CALC-MCNC: 154 MG/DL (ref 50–140)
LEUKOCYTE ESTERASE UR QL STRIP.AUTO: 250
LYMPHOCYTES # BLD AUTO: 1.66 X10(3)/MCL (ref 0.6–4.6)
LYMPHOCYTES NFR BLD AUTO: 28.6 %
MCH RBC QN AUTO: 31.1 PG (ref 27–31)
MCHC RBC AUTO-ENTMCNC: 32.9 G/DL (ref 33–36)
MCV RBC AUTO: 94.5 FL (ref 80–94)
MONOCYTES # BLD AUTO: 0.51 X10(3)/MCL (ref 0.1–1.3)
MONOCYTES NFR BLD AUTO: 8.8 %
NEUTROPHILS # BLD AUTO: 3.33 X10(3)/MCL (ref 2.1–9.2)
NEUTROPHILS NFR BLD AUTO: 57.4 %
NITRITE UR QL STRIP.AUTO: NEGATIVE
NRBC BLD AUTO-RTO: 0 %
PH UR STRIP.AUTO: 6 [PH]
PLATELET # BLD AUTO: 250 X10(3)/MCL (ref 130–400)
PMV BLD AUTO: 9.4 FL (ref 7.4–10.4)
POTASSIUM SERPL-SCNC: 4.5 MMOL/L (ref 3.5–5.1)
PROT SERPL-MCNC: 7 GM/DL (ref 5.8–7.6)
PROT UR QL STRIP.AUTO: NEGATIVE
RBC # BLD AUTO: 3.83 X10(6)/MCL (ref 4.2–5.4)
RBC #/AREA URNS AUTO: ABNORMAL /HPF
RBC UR QL AUTO: NEGATIVE
SODIUM SERPL-SCNC: 144 MMOL/L (ref 136–145)
SP GR UR STRIP.AUTO: 1.02 (ref 1–1.03)
SQUAMOUS #/AREA URNS LPF: ABNORMAL /HPF
T3FREE SERPL-MCNC: 2.54 PG/ML (ref 1.58–3.91)
T4 FREE SERPL-MCNC: 1.08 NG/DL (ref 0.7–1.48)
TRIGL SERPL-MCNC: 62 MG/DL (ref 37–140)
TSH SERPL-ACNC: 2.96 UIU/ML (ref 0.35–4.94)
UROBILINOGEN UR STRIP-ACNC: NORMAL
VLDLC SERPL CALC-MCNC: 12 MG/DL
WBC # SPEC AUTO: 5.8 X10(3)/MCL (ref 4.5–11.5)
WBC #/AREA URNS AUTO: ABNORMAL /HPF

## 2023-12-06 PROCEDURE — 84481 FREE ASSAY (FT-3): CPT

## 2023-12-06 PROCEDURE — 80053 COMPREHEN METABOLIC PANEL: CPT

## 2023-12-06 PROCEDURE — 85025 COMPLETE CBC W/AUTO DIFF WBC: CPT

## 2023-12-06 PROCEDURE — 80061 LIPID PANEL: CPT

## 2023-12-06 PROCEDURE — 84443 ASSAY THYROID STIM HORMONE: CPT

## 2023-12-06 PROCEDURE — 84439 ASSAY OF FREE THYROXINE: CPT

## 2023-12-06 PROCEDURE — 81001 URINALYSIS AUTO W/SCOPE: CPT

## 2023-12-06 PROCEDURE — 36415 COLL VENOUS BLD VENIPUNCTURE: CPT

## 2023-12-06 NOTE — TELEPHONE ENCOUNTER
----- Message from Shaista Carter sent at 12/6/2023  9:25 AM CST -----  .Type:  Needs Medical Advice    Who Called: pt  Symptoms (please be specific):   How long has patient had these symptoms:    Pharmacy name and phone #:    Would the patient rather a call back or a response via [facility]?   Best Call Back Number: [phone]  Additional Information: pt went to do her labs today a thyroid function wasn't in the orders but enough blood was taken she asking for an order to sent in for the test  at [facility]

## 2023-12-08 ENCOUNTER — TELEPHONE (OUTPATIENT)
Dept: INTERNAL MEDICINE | Facility: CLINIC | Age: 73
End: 2023-12-08
Payer: MEDICARE

## 2023-12-08 NOTE — TELEPHONE ENCOUNTER
----- Message from Marj Michelle sent at 12/8/2023 10:40 AM CST -----  .Type:  Patient Returning Call    Who Called:Ngozi  Who Left Message for Patient:pt  Does the patient know what this is regarding?:Call back  Would the patient rather a call back or a response via [facility]? JAZMIN  Best Call Back Number:[phone]  Additional Information: Please call back about labs

## 2023-12-08 NOTE — TELEPHONE ENCOUNTER
Spoke to pt and she stated that she is having a pressure when she is sits in her vaginal area. Per Dr. Cornejo have pt take tylenol over the weekend, and will discuss further at appointment on Monday 12/11/23

## 2023-12-11 ENCOUNTER — OFFICE VISIT (OUTPATIENT)
Dept: INTERNAL MEDICINE | Facility: CLINIC | Age: 73
End: 2023-12-11
Payer: MEDICARE

## 2023-12-11 DIAGNOSIS — E66.01 CLASS 2 SEVERE OBESITY DUE TO EXCESS CALORIES WITH SERIOUS COMORBIDITY AND BODY MASS INDEX (BMI) OF 38.0 TO 38.9 IN ADULT: ICD-10-CM

## 2023-12-11 DIAGNOSIS — E78.49 OTHER HYPERLIPIDEMIA: Primary | ICD-10-CM

## 2023-12-11 DIAGNOSIS — I10 PRIMARY HYPERTENSION: ICD-10-CM

## 2023-12-11 PROCEDURE — 3288F FALL RISK ASSESSMENT DOCD: CPT | Mod: CPTII,,, | Performed by: INTERNAL MEDICINE

## 2023-12-11 PROCEDURE — 1160F RVW MEDS BY RX/DR IN RCRD: CPT | Mod: CPTII,,, | Performed by: INTERNAL MEDICINE

## 2023-12-11 PROCEDURE — 99214 OFFICE O/P EST MOD 30 MIN: CPT | Mod: ,,, | Performed by: INTERNAL MEDICINE

## 2023-12-11 PROCEDURE — 3288F PR FALLS RISK ASSESSMENT DOCUMENTED: ICD-10-PCS | Mod: CPTII,,, | Performed by: INTERNAL MEDICINE

## 2023-12-11 PROCEDURE — 3044F PR MOST RECENT HEMOGLOBIN A1C LEVEL <7.0%: ICD-10-PCS | Mod: CPTII,,, | Performed by: INTERNAL MEDICINE

## 2023-12-11 PROCEDURE — 4010F ACE/ARB THERAPY RXD/TAKEN: CPT | Mod: CPTII,,, | Performed by: INTERNAL MEDICINE

## 2023-12-11 PROCEDURE — 1101F PR PT FALLS ASSESS DOC 0-1 FALLS W/OUT INJ PAST YR: ICD-10-PCS | Mod: CPTII,,, | Performed by: INTERNAL MEDICINE

## 2023-12-11 PROCEDURE — 1159F MED LIST DOCD IN RCRD: CPT | Mod: CPTII,,, | Performed by: INTERNAL MEDICINE

## 2023-12-11 PROCEDURE — 1159F PR MEDICATION LIST DOCUMENTED IN MEDICAL RECORD: ICD-10-PCS | Mod: CPTII,,, | Performed by: INTERNAL MEDICINE

## 2023-12-11 PROCEDURE — 1101F PT FALLS ASSESS-DOCD LE1/YR: CPT | Mod: CPTII,,, | Performed by: INTERNAL MEDICINE

## 2023-12-11 PROCEDURE — 3044F HG A1C LEVEL LT 7.0%: CPT | Mod: CPTII,,, | Performed by: INTERNAL MEDICINE

## 2023-12-11 PROCEDURE — 1160F PR REVIEW ALL MEDS BY PRESCRIBER/CLIN PHARMACIST DOCUMENTED: ICD-10-PCS | Mod: CPTII,,, | Performed by: INTERNAL MEDICINE

## 2023-12-11 PROCEDURE — 4010F PR ACE/ARB THEARPY RXD/TAKEN: ICD-10-PCS | Mod: CPTII,,, | Performed by: INTERNAL MEDICINE

## 2023-12-11 PROCEDURE — 99214 PR OFFICE/OUTPT VISIT, EST, LEVL IV, 30-39 MIN: ICD-10-PCS | Mod: ,,, | Performed by: INTERNAL MEDICINE

## 2023-12-11 RX ORDER — HYDROCHLOROTHIAZIDE 12.5 MG/1
12.5 TABLET ORAL DAILY
COMMUNITY
Start: 2023-08-03

## 2023-12-11 NOTE — PROGRESS NOTES
"Subjective:      Patient ID: Ngozi Webb is a 73 y.o. female.    Chief Complaint: Hypertension (6 month f/u) and Hyperlipidemia (6 month f/u)      HPI:  73 year old female here for HTN, HLD  Hx of lumbar laminectomy ?  Kyphoplasty in  after L4 fracture after sustaining a fall.  Has osteopenia.  Dr. 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 ER/WY + and HER-2 status unknown.  History of [facility] in ; Dr. Jean had EGD 10/2021   Has osteopenia.  C scope in [facility]  S/P left L3/4 LLIF right L3/4 tubular decompression with MIS L3/4 PLIF (artifical disc)  She saw her cardiologist Dr. Padilla on Xarelto for paroxysmal atrial fibrillation  He also recently cut her HCTZ and a half upon reviewing her labs you can see that her sodium and chloride levels increased she is gained 7 lb since her last office visit.  Long discussion in regards to Mediterranean based diet she eats too much animal protein, too much butter advised on keeping it simple eating more fruits veggies whole grains and fish she would like to give this a six-month trial before considering statin treatment.    Past Medical History:  Past Medical History:   Diagnosis Date    DDD (degenerative disc disease), lumbar     GERD (gastroesophageal reflux disease)     Hypertension     Hyperthyroidism     Lumbar radiculopathy     Morbid obesity     Osteoarthritis of both knees     Paroxysmal atrial fibrillation      Past Surgical History:   Procedure Laterality Date    BILATERAL MASTECTOMY      per powercharts notes: 10/14/21    BREAST SURGERY  1991     SECTION      CHOLECYSTECTOMY      COSMETIC SURGERY  1991    ESOPHAGOGASTRODUODENOSCOPY  10/04/2021    HYSTERECTOMY  1974    SPINE SURGERY  2022     Review of patient's allergies indicates:   Allergen Reactions    Meperidine Nausea Only    Midazolam Nausea And Vomiting     "Makes her sick"       Current Outpatient " Medications on File Prior to Visit   Medication Sig Dispense Refill    buPROPion (WELLBUTRIN XL) 150 MG TB24 tablet Take 1 tablet (150 mg total) by mouth once daily. 90 tablet 3    calcium carbonate (CALCIUM 600 ORAL) Take 1,200 mg by mouth Daily.      cetirizine (ZYRTEC) 10 MG tablet Take 10 mg by mouth once daily.      cholecalciferol, vitamin D3, (VITAMIN D3) 50 mcg (2,000 unit) Cap capsule Take 5,000 Units by mouth Daily.      diltiaZEM (CARDIZEM CD) 120 MG Cp24 Take 120 mg by mouth once daily.      hydroCHLOROthiazide (HYDRODIURIL) 12.5 MG Tab Take 12.5 mg by mouth once daily.      levothyroxine (SYNTHROID) 112 MCG tablet TAKE 1 TABLET DAILY 90 tablet 3    lisinopriL 10 MG tablet TAKE 1 TABLET DAILY 90 tablet 3    melatonin 3 mg Cap Take 6 mg by mouth nightly.      omeprazole (PRILOSEC) 20 MG capsule Take 20 mg by mouth 2 (two) times daily.      XARELTO 20 mg Tab Take 20 mg by mouth once daily.      [DISCONTINUED] hydroCHLOROthiazide (HYDRODIURIL) 25 MG tablet TAKE 1 TABLET DAILY (REPLACES LISINOPRIL/HCTZ, CONTINUE LISINOPRIL 10 MG) 90 tablet 3     No current facility-administered medications on file prior to visit.     Social History     Socioeconomic History    Marital status:    Tobacco Use    Smoking status: Never    Smokeless tobacco: Never   Substance and Sexual Activity    Alcohol use: Never    Drug use: Never    Sexual activity: Yes     Partners: Male     Birth control/protection: None     Social Determinants of Health     Financial Resource Strain: Low Risk  (12/11/2023)    Overall Financial Resource Strain (CARDIA)     Difficulty of Paying Living Expenses: Not hard at all   Food Insecurity: No Food Insecurity (12/11/2023)    Hunger Vital Sign     Worried About Running Out of Food in the Last Year: Never true     Ran Out of Food in the Last Year: Never true   Transportation Needs: No Transportation Needs (12/11/2023)    PRAPARE - Transportation     Lack of Transportation (Medical): No     Lack  "of Transportation (Non-Medical): No   Physical Activity: Insufficiently Active (2023)    Exercise Vital Sign     Days of Exercise per Week: 3 days     Minutes of Exercise per Session: 30 min   Stress: No Stress Concern Present (2023)    [employer] [place] [employer] - Occupational Stress Questionnaire     Feeling of Stress : Not at all   Social Connections: Moderately Isolated (2023)    Social Connection and Isolation Panel [NHANES]     Frequency of Communication with Friends and Family: More than three times a week     Frequency of Social Gatherings with Friends and Family: More than three times a week     Attends Yazdanism Services: Never     Active Member of Clubs or Organizations: No     Attends Club or Organization Meetings: Never     Marital Status:    Housing Stability: Low Risk  (2023)    Housing Stability Vital Sign     Unable to Pay for Housing in the Last Year: No     Number of Places Lived in the Last Year: 1     Unstable Housing in the Last Year: No     Family History   Problem Relation Age of Onset    Uterine cancer Mother     Stroke Mother     Alcohol abuse Father     Early death Father          at age 44    Hypertension Father     Stroke Father     COPD Sister     Diabetes Sister     Depression Daughter     Depression Daughter        Review of Systems  A comprehensive review of systems was performed and was negative with exception of what is documented above.     Objective:   BP (P) 136/84 (BP Location: Left arm, Patient Position: Sitting, BP Method: Medium (Manual))   Pulse (P) 70   Temp (P) 97.6 °F (36.4 °C) (Temporal)   Resp (P) 16   Ht (P) 5' 4" (1.626 m)   Wt (P) 101.6 kg (224 lb)   SpO2 (P) 98%   BMI (P) 38.45 kg/m²   Physical Exam  General : Alert and oriented, No acute distress, afebrile. Obese  Eye : PERRLA. EOMI. Normal conjunctiva, Sclerae are nonicteric.  Integumentary : Warm, moist, intact.  Neurologic : Alert, Oriented  Psychiatric : " Cooperative, Appropriate mood & affect.   Assessment/ Plan:   1. Other hyperlipidemia  Assessment & Plan:  On statin management at this time long discussion about Mediterranean based lifestyle and increasing intake of fresh fruits vegetables whole grains and fish and decreasing intake of animal protein including red meat, pork, eggs and full fat dairy.  It would be helpful for her to log her food as well  I will see her back in the office in 6 months        2. Primary hypertension  Assessment & Plan:  I really think that her HCTZ dosing needs to go back up to 25 mg she said she is going to start taking the higher dose every other day.  I have advised for her to keep me posted   Hypertension Medications               diltiaZEM (CARDIZEM CD) 120 MG Cp24 Take 120 mg by mouth once daily.    hydroCHLOROthiazide (HYDRODIURIL) 12.5 MG Tab Take 12.5 mg by mouth once daily.    lisinopriL 10 MG tablet TAKE 1 TABLET DAILY              3. Class 2 severe obesity due to excess calories with serious comorbidity and body mass index (BMI) of 38.0 to 38.9 in adult  Assessment & Plan:  Body mass index is 38.45 kg/m² (pended). Morbid obesity complicates all aspects of disease management from diagnostic modalities to treatment. Weight loss encouraged and health benefits explained to patient.                    Follow up in about 6 months (around 6/11/2024) for with labs prior to visit, [facility].

## 2023-12-11 NOTE — ASSESSMENT & PLAN NOTE
Body mass index is 38.45 kg/m² (pended). Morbid obesity complicates all aspects of disease management from diagnostic modalities to treatment. Weight loss encouraged and health benefits explained to patient.

## 2023-12-11 NOTE — ASSESSMENT & PLAN NOTE
I really think that her HCTZ dosing needs to go back up to 25 mg she said she is going to start taking the higher dose every other day.  I have advised for her to keep me posted   Hypertension Medications               diltiaZEM (CARDIZEM CD) 120 MG Cp24 Take 120 mg by mouth once daily.    hydroCHLOROthiazide (HYDRODIURIL) 12.5 MG Tab Take 12.5 mg by mouth once daily.    lisinopriL 10 MG tablet TAKE 1 TABLET DAILY

## 2023-12-11 NOTE — ASSESSMENT & PLAN NOTE
On statin management at this time long discussion about Mediterranean based lifestyle and increasing intake of fresh fruits vegetables whole grains and fish and decreasing intake of animal protein including red meat, pork, eggs and full fat dairy.  It would be helpful for her to log her food as well  I will see her back in the office in 6 months

## 2024-01-08 ENCOUNTER — PATIENT MESSAGE (OUTPATIENT)
Dept: INTERNAL MEDICINE | Facility: CLINIC | Age: 74
End: 2024-01-08
Payer: MEDICARE

## 2024-01-08 DIAGNOSIS — M54.9 DORSALGIA, UNSPECIFIED: Primary | ICD-10-CM

## 2024-01-10 ENCOUNTER — TELEPHONE (OUTPATIENT)
Dept: INTERNAL MEDICINE | Facility: CLINIC | Age: 74
End: 2024-01-10
Payer: MEDICARE

## 2024-01-10 DIAGNOSIS — M54.9 DORSALGIA, UNSPECIFIED: Primary | ICD-10-CM

## 2024-01-10 NOTE — TELEPHONE ENCOUNTER
----- Message from Gonzalo Thomas sent at 1/10/2024 10:01 AM CST -----  .Type:  Needs Medical Advice    Who Called: pt  Symptoms (please be specific):    How long has patient had these symptoms:    Pharmacy name and phone #:    Would the patient rather a call back or a response via [facility]? Call back  Best Call Back Number:  [phone]  Additional Information: pt is calling to discuss current referral for physical therapy wanting to change referral to [facility] physical therapy in [place] [phone]

## 2024-01-10 NOTE — TELEPHONE ENCOUNTER
----- Message from Antwon Knott MD sent at 1/10/2024  1:16 PM CST -----  CT imaging reviewed chronic degenerative changes appreciated which of course I expect.  Not really seeing anything new or overt pain maybe secondary to some foraminal stenosis L4-L5 appears to be a little bit worse on the left than the right; can follow up with Neurosurgery for recommendations

## 2024-01-10 NOTE — TELEPHONE ENCOUNTER
Spoke to pt and she stated that [facility] in [place] was to far away for her to travel to.   Referral reordered for [facility].

## 2024-01-12 ENCOUNTER — TELEPHONE (OUTPATIENT)
Dept: INTERNAL MEDICINE | Facility: CLINIC | Age: 74
End: 2024-01-12
Payer: MEDICARE

## 2024-01-15 DIAGNOSIS — F41.9 ANXIETY: ICD-10-CM

## 2024-01-16 RX ORDER — BUPROPION HYDROCHLORIDE 150 MG/1
150 TABLET ORAL
Qty: 90 TABLET | Refills: 3 | Status: SHIPPED | OUTPATIENT
Start: 2024-01-16

## 2024-01-18 DIAGNOSIS — M54.9 DORSALGIA, UNSPECIFIED: Primary | ICD-10-CM

## 2024-01-24 ENCOUNTER — TELEPHONE (OUTPATIENT)
Dept: NEUROSURGERY | Facility: CLINIC | Age: 74
End: 2024-01-24
Payer: MEDICARE

## 2024-01-24 NOTE — TELEPHONE ENCOUNTER
"I did receive the referral for Dr. Pedraza. I spoke with the patient to inquire about the symptoms she is having. She is C/O lower back pain that radiates down into both legs. She describes this pain as a "gnawing, annoying deep nerve pain". She stated she did have back surgery in August of 2022 with Dr. Ilya Lam in [place]. Her back pain was better after the surgery but did began to experience the pain intermittently since then. The pain has now progressed to a constant pain. This has been going on for about 4 months now and is due to a couple of falls she has had. In 1980, the patient fell down a flight of stairs and had sx on L5. She also fell back in 2017 and had a kyphoplasty on L4 in [place] where she was residing at that time. From that incident, she was referred to Dr. Nathan and Dr. Nathan referred her to Dr. Lam. When the pain is really bad, she would rate it a 8-9/10 and is taking Tylenol and Gabapentin prescribed by Dr. Cornejo. The patient has not had any recent testing since CT in 1/2024. Denies seeing any other doctors for this besides Dr. Nathan and referring MD. She has had injections with Dr. Nathan and is currently doing a course of PT at [facility] in [place]. I did schedule her with Dr. Pedraza for 2/1/24 at 11:00. She was compliant w date and time. I did inquire about an email address so I can obtain a release of information to obtain the patients OP/PT and injection notes prior to her appointment. I will email release form and she will email it back.  "

## 2024-01-30 NOTE — TELEPHONE ENCOUNTER
I noticed we did receive MRI reports from Dr. Nathan's office so I called the patient to see if she would be able to  the disc from her previous MRI's that were performed at [facility]. She stated she is actually on her way to [place] right now so she will pass by and obtain them. She will also try to get the [facility]R [facility].

## 2024-01-30 NOTE — TELEPHONE ENCOUNTER
I also called Dr. Lam's office to F/U on the medical request form I did fax to them so I can obtain OP notes prior to the appointment. The lady in medical records requested I fax the request again to [phone].

## 2024-01-31 ENCOUNTER — TELEPHONE (OUTPATIENT)
Dept: NEUROSURGERY | Facility: CLINIC | Age: 74
End: 2024-01-31
Payer: MEDICARE

## 2024-01-31 NOTE — TELEPHONE ENCOUNTER
The patient dropped off her discs. I imported them into Epic. I faxed a request to [facility] to get XR report from 10/2021

## 2024-01-31 NOTE — TELEPHONE ENCOUNTER
I spoke with the patient in regards to her upcoming appointment with Dr. Pedraza for 2/1/24. I did advise her that this appointment will need to be r/s due to an emergent sx that Dr. Pedraza has to perform. She totally understood. I r/s her appointment with Dr. Pedraza for 2/20/24 at 2:00. She was compliant w date and time.

## 2024-02-06 NOTE — PROGRESS NOTES
[facility]   Neurosurgery      Ngozi Webb  MRN: [id number], CSN: [id number]      : 1950   Age: 73 y.o. female  Payor: T MANAGED MEDICARE / Plan: [employer] MEDICARE PLAN PPO / Product Type: Medicare Advantage /       Ref:  Antwon Knott MD  [address] [place] [address]    PCP: Antwon Knott MD    Visit Date: 2024     Patient Active Problem List   Diagnosis    Hypothyroidism    Gastroesophageal reflux disease    Hypertension    Paroxysmal atrial fibrillation    Postmenopausal state    Class 2 severe obesity due to excess calories with serious comorbidity in adult    Other hyperlipidemia       SUBJECTIVE:      CC:   Chief Complaint   Patient presents with    Low back and b leg pain, improved with PT       HPI:   Ms. Webb is a 73 y.o. female who has a past medical history significant for hypertension, atrial fibrillation, breast cancer, hypothyroidism, GERD, and osteoarthritis in the bilateral knees.  She is s/p a lumbar laminectomy in  and s/p L4 kyphoplasty in  in [place].  In addition, the patient is s/p a L3-4 left interbody fusion ([facility]) and L3-4 PLIF ([facility]) on 2022 by Dr. Lam in [place].  Ms. Webb presents as a new patient in the neurosurgery clinic for chronic low back pain radiating into her bilateral, lateral legs for a duration of 4 months since 2023.  Her pain level has gradually improved with physical therapy.    After her L3-4 fusion surgery on 2022, Ms. Webb had partial improvement in her low back pain and radiating right leg pain.  However, her low back pain gradually increased approximately 4 months ago in 2023 without any traumatic events.  This pain radiates from her lower back into her bilateral buttocks and lateral aspects of her legs.  The patient reports her low back to leg pain ratio being 20:80, with the majority of pain in her bilateral legs.  She does not have any  numbness symptoms.  Ms. Webb feels generally weak in her bilateral legs towards the end of the day.  On one occasion, the patient had severe back pain and associated urinary incontinence.  She does not have any saddle anesthesia.  The patient has been fully ambulatory, although she has bilateral knee pain due to osteoarthritis.    There is increased low back pain with walking.  There is a reduction of pain when participating in physical therapy exercises, applying ice, and taking Tylenol as well as Neurontin.  The patient has participated in 2 weeks of physical therapy at [facility] in [facility] with gradual improvement in her symptoms.  Since her recent onset of pain, she has not completed any lumbar epidural steroid injections.  Ms. Webb is established with pain management specialist Dr. Vu, who completed serial lumbar epidural steroid injections prior to her lumbar fusion surgery.  She did not feel that any of these injection procedures provided any significant relief.    The patient's established cardiologist is Dr. Padilla.  It is noted that Ms. Webb has been prescribed Xarelto, but stopped taking this medication due to high cost.  She has been taking aspirin 81 mg every other day.      Patient Active Problem List    Diagnosis Date Noted    Postmenopausal state 06/07/2023    Class 2 severe obesity due to excess calories with serious comorbidity in adult 06/07/2023    Other hyperlipidemia 06/07/2023    Gastroesophageal reflux disease 05/24/2022    Hypertension 05/24/2022    Paroxysmal atrial fibrillation 05/24/2022    Hypothyroidism 10/31/2012     Past Medical History:   Diagnosis Date    DDD (degenerative disc disease), lumbar     Dorsalgia     GERD (gastroesophageal reflux disease)     Hyperlipidemia     Hypertension     Hypothyroidism, unspecified type     Lumbar radiculopathy     Morbid obesity     Osteoarthritis of both knees     Paroxysmal atrial fibrillation      Past Surgical  History:   Procedure Laterality Date    BILATERAL MASTECTOMY      per powercharts notes: 10/14/21    BREAST SURGERY  1991     SECTION      CHOLECYSTECTOMY      COSMETIC SURGERY  1991    ESOPHAGOGASTRODUODENOSCOPY  10/04/2021    HYSTERECTOMY  1974    SPINE SURGERY  2022       Current Outpatient Medications:     acetaminophen (TYLENOL) 325 MG tablet, Take 325 mg by mouth every 6 (six) hours as needed for Pain., Disp: , Rfl:     buPROPion (WELLBUTRIN XL) 150 MG TB24 tablet, TAKE 1 TABLET DAILY, Disp: 90 tablet, Rfl: 3    calcium carbonate (CALCIUM 600 ORAL), Take 1,200 mg by mouth Daily., Disp: , Rfl:     cholecalciferol, vitamin D3, (VITAMIN D3) 50 mcg (2,000 unit) Cap capsule, Take 5,000 Units by mouth Daily., Disp: , Rfl:     diltiaZEM (CARDIZEM CD) 120 MG Cp24, Take 120 mg by mouth once daily., Disp: , Rfl:     diphenhydrAMINE (BENADRYL) 25 mg capsule, Take 25 mg by mouth every 6 (six) hours as needed for Itching., Disp: , Rfl:     gabapentin (NEURONTIN) 100 MG capsule, Take 100 mg by mouth once., Disp: , Rfl:     hydroCHLOROthiazide (HYDRODIURIL) 12.5 MG Tab, Take 12.5 mg by mouth once daily., Disp: , Rfl:     levothyroxine (SYNTHROID) 112 MCG tablet, TAKE 1 TABLET DAILY, Disp: 90 tablet, Rfl: 3    lisinopriL 10 MG tablet, TAKE 1 TABLET DAILY, Disp: 90 tablet, Rfl: 3    magnesium 30 mg Tab, Take by mouth once., Disp: , Rfl:     melatonin 3 mg Cap, Take 6 mg by mouth nightly. (Patient taking differently: Take 8 mg by mouth nightly.), Disp: , Rfl:     multivitamin (THERAGRAN) per tablet, Take 1 tablet by mouth once daily., Disp: , Rfl:     omega-3 fatty acids/fish oil (FISH OIL-OMEGA-3 FATTY ACIDS) 300-1,000 mg capsule, Take by mouth once daily., Disp: , Rfl:     omeprazole (PRILOSEC) 20 MG capsule, Take 20 mg by mouth 2 (two) times daily., Disp: , Rfl:     TURMERIC ORAL, Take by mouth., Disp: , Rfl:     UNABLE TO FIND, Lecithin, Disp: , Rfl:     Review of patient's allergies indicates:  "  Allergen Reactions    Meperidine Nausea Only    Midazolam Nausea And Vomiting     "Makes her sick"         Social History     Tobacco Use    Smoking status: Never    Smokeless tobacco: Never   Substance Use Topics    Alcohol use: Never     Occupation: Retired     Family History   Problem Relation Age of Onset    Uterine cancer Mother     Stroke Mother     Alcohol abuse Father     Early death Father          at age 44    Hypertension Father     Stroke Father     COPD Sister     Diabetes Sister     Depression Daughter     Depression Daughter        ROS:  Constitutional:  Negative for chills and fever.   HENT:  Negative for congestion and sore throat.    Eyes:  Negative for blurred vision and double vision.   Respiratory:  Negative for cough and shortness of breath.    Cardiovascular:  Negative for chest pain and palpitations.   Gastrointestinal:  Negative for constipation, diarrhea, nausea and vomiting.   Musculoskeletal:  Positive for back pain, Negative for neck pain.   Neurological:  Negative for sensory change, focal weakness and headaches.   Endo/Heme/Allergies:  Does not bruise/bleed easily.   Psychiatric/Behavioral:  Positive for depression and anxiety.      OBJECTIVE:     EXAMINATION:  BP (!) 144/73   Pulse 80   Resp 16   Ht 5' 4" (1.626 m)   Wt 103.9 kg (229 lb)   BMI 39.31 kg/m²   Body Habitus: Significantly Obese    Physical Exam:  Constitutional: The patient is well-developed and cooperative, sitting comfortably in a chair    Mental Status:   Oriented to person, place, and time  Normal speech    Cranial nerves:  CN II: PERRL, 4 to 3 mm, brisk bilaterally  CN III, IV, and VI: extraocular movements normal, no ptosis  CN V: normal facial sensation and masseter function  CN VII: facial strength normal and symmetrical  CN VIII: hearing normal bilaterally  CN IX and X: swallowing and phonation normal  CN XI: shoulder shrug intact bilaterally  CN XII: tongue protrusion " midline    Motor:  Muscle bulk: normal in all extremities  Tone: normal in all extremities    Upper extremities:  Deltoid: right 5/5; left 5/5  Biceps: right 5/5; left 5/5  Triceps: right 5/5; left 5/5  : right 5/5; left 5/5  Interosseous muscles: right 5/5; left 5/5    Lower extremities:  Hip flexors: right 5/5; left 5/5  Knee extensors: right 5/5; left 5/5  Knee flexors: right 5/5; left 5/5  Foot dorsiflexors: right 5/5; left 5/5  Foot plantar flexors: right 5/5; left 5/5  Extensor hallucis longus: right 5/5; left 5/5    Sensation:  Normal to light touch x 4 extremities    Reflexes:  Biceps: right 1+/4; left 1+/4  Brachioradialis: right 1+/4; left 1+/4  Triceps: right 1+/4; left 1+/4  Knee: right 1+/4; left 1+/4  Ankle: right 0/4; left 0/4    Gottis sign: right negative; left negative  Babinski: right downgoing; left downgoing  Clonus: right negative; left negative    Coordination:  Finger to nose: right normal; left normal    Musculoskeletal:    Gait:  Toe walk- normal  Heel walk- normal  Tandem gait- off balance    Straight leg test: right negative; left negative    Cervical: No pain with palpation  Upper back: No pain with palpation  Lower back: No pain with palpation, well healed L lateral flank scar, small midline scar and bilateral parasagittal scars      DIAGNOSTICS REVIEW OF IMAGING, LAB & OTHER STUDIES:  I have personally reviewed and evaluated the following reports as well as radiographic studies:    CT lumbar spine with and without contrast, 01/10/2024- there is Grade I anterior spondylolisthesis of L3 on L4 measuring 4 mm.  There are postprocedure changes with a chronic compression fracture at L4 s/p kyphoplasty.  There is 40% loss of height and the superior endplate has mild retropulsion.  Postoperative changes s/p L3-4 fusion involving an interbody graft, posterior pedicle screws, and left lateral plate in place.  At L4-5, there is mild stenosis with left neuroforaminal narrowing.  At L5-S1,  there is severe degenerative disc disease with left neuroforaminal narrowing.       ASSESSMENT:  Ms. Webb is a 73 y.o. female who has a past medical history significant for hypertension, atrial fibrillation, breast cancer, hypothyroidism, GERD, and osteoarthritis in the bilateral knees.  She is s/p a lumbar laminectomy in 1983 and s/p L4 kyphoplasty in 2017 in [place].  In addition, the patient is s/p a L3-4 left interbody fusion ([facility]) and L3-4 PLIF ([facility]) on 08/16/2022 by Dr. Lam in [place].  Ms. Webb presents as a new patient in the neurosurgery clinic for chronic low back pain radiating into her bilateral, lateral legs for a duration of 4 months since October 2023.  Her pain level has gradually improved with physical therapy.  The patient has a normal motor and sensory neurological exam.     I reviewed pertinent imaging studies with the patient.  A CT lumbar spine with and without contrast demonstrates Grade I anterior spondylolisthesis of L3 on L4 measuring 4 mm.  There are postprocedure changes with a chronic compression fracture at L4 s/p kyphoplasty.  There is 40% loss of height and the superior endplate has mild retropulsion.  Postoperative changes s/p L3-4 fusion involving an interbody graft, posterior pedicle screws, and left lateral plate in place.  At L4-5, there is mild stenosis with left neuroforaminal narrowing.  At L5-S1, there is severe degenerative disc disease with left neuroforaminal narrowing.         PLAN:    Encounter Diagnoses   Name Primary?    Degenerative disc disease, lumbar Yes    Chronic bilateral low back pain with bilateral sciatica     S/P lumbar fusion      No orders of the defined types were placed in this encounter.       1.  I discussed with Ms. Webb that continued optimization of medical management is recommended for her chronic low back pain and radiation into her bilateral lower extremities, especially because her symptoms have improved with physical  therapy.  She is very agreeable to this nonsurgical plan of care.    2.  The prerequisite for proceeding with any type of redo lumbar surgery would require preoperative cardiac clearance from Dr. Padilla as well as permission to discontinue taking any anticoagulant medications preoperatively.  It is noted that Ms. Webb stopped taking Xarelto due to financial reasons and is taking aspirin 81 mg every other day instead.      3.  In addition, the patient's BMI is elevated at 39.31.  She is recommended to lose at least 50 lbs before proceeding with any type elective spinal surgery.      4.  Any type of surgical decision-making will require additional imaging including lumbar spine x-rays with AP lateral and flexion-extension views as well as a updated MRI lumbar spine without gadolinium.    5.  She is to continue with physical therapy at [facility] in [place], [place].    6.  The patient has been instructed to contact her established pain management specialist Dr. Brown after completing her physical therapy program if her pain level continues to inhibit her functional activities.  She may be considered a candidate for a lumbar epidural steroid injection.    7.  Ms. Webb is encouraged to follow up in the neurosurgery clinic on an as-needed basis for any concerning changes in condition or symptoms.        This note will be sent to the patient's referring provider Dr. Antwon Judge and primary care provider Antwon Knott MD.           Alice Pedraza MD  Neurosurgeon

## 2024-02-06 NOTE — TELEPHONE ENCOUNTER
Dr. Pedraza did have some openings on 2/7/24 so I called the patient to see if she would like to come in sooner than her scheduled appointment. I got her r/s for 2/7/24 at 2:00. She was compliant w date and time.

## 2024-02-07 ENCOUNTER — OFFICE VISIT (OUTPATIENT)
Dept: NEUROSURGERY | Facility: CLINIC | Age: 74
End: 2024-02-07
Payer: MEDICARE

## 2024-02-07 VITALS
DIASTOLIC BLOOD PRESSURE: 73 MMHG | BODY MASS INDEX: 39.09 KG/M2 | WEIGHT: 229 LBS | SYSTOLIC BLOOD PRESSURE: 144 MMHG | RESPIRATION RATE: 16 BRPM | HEART RATE: 80 BPM | HEIGHT: 64 IN

## 2024-02-07 DIAGNOSIS — M54.42 CHRONIC BILATERAL LOW BACK PAIN WITH BILATERAL SCIATICA: ICD-10-CM

## 2024-02-07 DIAGNOSIS — M51.36 DEGENERATIVE DISC DISEASE, LUMBAR: Primary | ICD-10-CM

## 2024-02-07 DIAGNOSIS — G89.29 CHRONIC BILATERAL LOW BACK PAIN WITH BILATERAL SCIATICA: ICD-10-CM

## 2024-02-07 DIAGNOSIS — Z98.1 S/P LUMBAR FUSION: ICD-10-CM

## 2024-02-07 DIAGNOSIS — M54.41 CHRONIC BILATERAL LOW BACK PAIN WITH BILATERAL SCIATICA: ICD-10-CM

## 2024-02-07 PROCEDURE — 3008F BODY MASS INDEX DOCD: CPT | Mod: CPTII,,, | Performed by: NEUROLOGICAL SURGERY

## 2024-02-07 PROCEDURE — 1125F AMNT PAIN NOTED PAIN PRSNT: CPT | Mod: CPTII,,, | Performed by: NEUROLOGICAL SURGERY

## 2024-02-07 PROCEDURE — 99204 OFFICE O/P NEW MOD 45 MIN: CPT | Mod: ,,, | Performed by: NEUROLOGICAL SURGERY

## 2024-02-07 PROCEDURE — 3288F FALL RISK ASSESSMENT DOCD: CPT | Mod: CPTII,,, | Performed by: NEUROLOGICAL SURGERY

## 2024-02-07 PROCEDURE — 1160F RVW MEDS BY RX/DR IN RCRD: CPT | Mod: CPTII,,, | Performed by: NEUROLOGICAL SURGERY

## 2024-02-07 PROCEDURE — 1101F PT FALLS ASSESS-DOCD LE1/YR: CPT | Mod: CPTII,,, | Performed by: NEUROLOGICAL SURGERY

## 2024-02-07 PROCEDURE — 3078F DIAST BP <80 MM HG: CPT | Mod: CPTII,,, | Performed by: NEUROLOGICAL SURGERY

## 2024-02-07 PROCEDURE — 1159F MED LIST DOCD IN RCRD: CPT | Mod: CPTII,,, | Performed by: NEUROLOGICAL SURGERY

## 2024-02-07 PROCEDURE — 3077F SYST BP >= 140 MM HG: CPT | Mod: CPTII,,, | Performed by: NEUROLOGICAL SURGERY

## 2024-02-07 RX ORDER — GABAPENTIN 100 MG/1
100 CAPSULE ORAL ONCE
COMMUNITY
End: 2024-03-21 | Stop reason: SDUPTHER

## 2024-02-07 RX ORDER — AMOXICILLIN 500 MG
CAPSULE ORAL DAILY
COMMUNITY

## 2024-02-07 RX ORDER — MULTIVITAMIN
1 TABLET ORAL DAILY
COMMUNITY

## 2024-02-07 RX ORDER — ACETAMINOPHEN 325 MG/1
325 TABLET ORAL EVERY 6 HOURS PRN
COMMUNITY

## 2024-02-07 RX ORDER — DIPHENHYDRAMINE HCL 25 MG
25 CAPSULE ORAL EVERY 6 HOURS PRN
COMMUNITY

## 2024-02-07 NOTE — PATIENT INSTRUCTIONS
Ms. Webb is a 73 y.o. female who has a past medical history significant for hypertension, atrial fibrillation, breast cancer, hypothyroidism, GERD, and osteoarthritis in the bilateral knees.  She is s/p a lumbar laminectomy in 1983 and s/p L4 kyphoplasty in 2017 in [place].  In addition, the patient is s/p a L3-4 left interbody fusion ([facility]uy) and L3-4 PLIF ([facility]) on 08/16/2022 by Dr. Lam in [place].  Ms. Webb presents as a new patient in the neurosurgery clinic for chronic low back pain radiating into her bilateral, lateral legs for a duration of 4 months since October 2023.  Her pain level has gradually improved with physical therapy.  The patient has a normal motor and sensory neurological exam.     I reviewed pertinent imaging studies with the patient.  A CT lumbar spine with and without contrast demonstrates Grade I anterior spondylolisthesis of L3 on L4 measuring 4 mm.  There are postprocedure changes with a chronic compression fracture at L4 s/p kyphoplasty.  There is 40% loss of height and the superior endplate has mild retropulsion.  Postoperative changes s/p L3-4 fusion involving an interbody graft, posterior pedicle screws, and left lateral plate in place.  At L4-5, there is mild stenosis with left neuroforaminal narrowing.  At L5-S1, there is severe degenerative disc disease with left neuroforaminal narrowing.         PLAN:    Encounter Diagnoses   Name Primary?    Degenerative disc disease, lumbar Yes    Chronic bilateral low back pain with bilateral sciatica     S/P lumbar fusion      No orders of the defined types were placed in this encounter.       1.  I discussed with Ms. Webb that continued optimization of medical management is recommended for her chronic low back pain and radiation into her bilateral lower extremities, especially because her symptoms have improved with physical therapy.  She is very agreeable to this nonsurgical plan of care.    2.  The prerequisite for proceeding  with any type of redo lumbar surgery would require preoperative cardiac clearance from Dr. Padilla as well as permission to discontinue taking any anticoagulant medications preoperatively.  It is noted that Ms. Webb stopped taking Xarelto due to financial reasons and is taking aspirin 81 mg every other day instead.      3.  In addition, the patient's BMI is elevated at 39.31.  She is recommended to lose at least 50 lbs before proceeding with any type elective spinal surgery.      4.  Any type of surgical decision-making will require additional imaging including lumbar spine x-rays with AP lateral and flexion-extension views as well as a updated MRI lumbar spine without gadolinium.    5.  She is to continue with physical therapy at [facility] in [place], [place].    6.  The patient has been instructed to contact her established pain management specialist Dr. Brown after completing her physical therapy program if her pain level continues to inhibit her functional activities.  She may be considered a candidate for a lumbar epidural steroid injection.    7.  Ms. Webb is encouraged to follow up in the neurosurgery clinic on an as-needed basis for any concerning changes in condition or symptoms.        This note will be sent to the patient's referring provider Dr. Antwon Judge and primary care provider Antwon Knott MD.

## 2024-02-08 ENCOUNTER — TELEPHONE (OUTPATIENT)
Dept: INTERNAL MEDICINE | Facility: CLINIC | Age: 74
End: 2024-02-08
Payer: MEDICARE

## 2024-02-08 DIAGNOSIS — I48.0 PAROXYSMAL ATRIAL FIBRILLATION: Primary | ICD-10-CM

## 2024-02-08 NOTE — TELEPHONE ENCOUNTER
----- Message from Antwon Knott MD sent at 2/8/2024  9:02 AM CST -----  Dr. Pedraza informed me that patient is not taking her anticoagulation she has AFib can you please inquire as to why.   ----- Message -----  From: Alice Pedraza MD  Sent: 2/7/2024   5:24 PM CST  To: Antwon Knott MD

## 2024-02-08 NOTE — TELEPHONE ENCOUNTER
Pt called to state that she canceled the prescription of Eliquis at [facility] because they do not have the generic. I asked her if they gave her a price of the Eliquis brand name and she said they did not. Pt is going to call other pharmacies to get a price of the medication, and call back to let us know where to send it. I told her that in the meantime she can  some samples of Xarelto to have while she figures out where she wants to send the Eliquis.

## 2024-02-08 NOTE — TELEPHONE ENCOUNTER
Spoke to pt. Pt Verbally confirmed understanding.  RX sent to [facility]. Pt did not say if she was going to come to get a sample

## 2024-02-08 NOTE — TELEPHONE ENCOUNTER
"Per Dr. Cornejo, "Absolutely not, it has to be dosed daily   She can come  some samples   Lets send in Eliquis 5mg po BID, see what the price is on that"  "

## 2024-02-08 NOTE — TELEPHONE ENCOUNTER
Spoke to pt who stated that the medication Xarelto is expensive; especially because both her and her  are taking it. She stated that she was told by a physician that Xarelto lasts in your system for 2 days, so she would like to know if she could do a prescription for every other day to not have to fill it so often. Pt is also open to other anticoagulates that are possibly cheaper.

## 2024-02-09 ENCOUNTER — PATIENT MESSAGE (OUTPATIENT)
Dept: INTERNAL MEDICINE | Facility: CLINIC | Age: 74
End: 2024-02-09
Payer: MEDICARE

## 2024-02-09 DIAGNOSIS — I48.0 PAROXYSMAL ATRIAL FIBRILLATION: Primary | ICD-10-CM

## 2024-02-09 DIAGNOSIS — I10 PRIMARY HYPERTENSION: ICD-10-CM

## 2024-03-09 ENCOUNTER — PATIENT MESSAGE (OUTPATIENT)
Dept: INTERNAL MEDICINE | Facility: CLINIC | Age: 74
End: 2024-03-09
Payer: MEDICARE

## 2024-03-09 DIAGNOSIS — E03.9 HYPOTHYROIDISM, UNSPECIFIED TYPE: ICD-10-CM

## 2024-03-11 RX ORDER — LEVOTHYROXINE SODIUM 112 UG/1
112 TABLET ORAL DAILY
Qty: 90 TABLET | Refills: 3 | Status: SHIPPED | OUTPATIENT
Start: 2024-03-11

## 2024-03-21 ENCOUNTER — PATIENT MESSAGE (OUTPATIENT)
Dept: INTERNAL MEDICINE | Facility: CLINIC | Age: 74
End: 2024-03-21
Payer: MEDICARE

## 2024-03-21 DIAGNOSIS — M54.9 DORSALGIA, UNSPECIFIED: Primary | ICD-10-CM

## 2024-03-21 RX ORDER — GABAPENTIN 100 MG/1
100 CAPSULE ORAL 2 TIMES DAILY
Qty: 180 CAPSULE | Refills: 3 | Status: SHIPPED | OUTPATIENT
Start: 2024-03-21

## 2024-04-30 ENCOUNTER — PATIENT MESSAGE (OUTPATIENT)
Dept: INTERNAL MEDICINE | Facility: CLINIC | Age: 74
End: 2024-04-30
Payer: MEDICARE

## 2024-04-30 DIAGNOSIS — I10 PRIMARY HYPERTENSION: Primary | ICD-10-CM

## 2024-04-30 RX ORDER — HYDROCHLOROTHIAZIDE 12.5 MG/1
12.5 TABLET ORAL DAILY
Qty: 90 TABLET | Refills: 3 | Status: SHIPPED | OUTPATIENT
Start: 2024-04-30 | End: 2024-05-07

## 2024-05-07 RX ORDER — HYDROCHLOROTHIAZIDE 25 MG/1
25 TABLET ORAL DAILY
Qty: 30 TABLET | Refills: 11 | Status: SHIPPED | OUTPATIENT
Start: 2024-05-07 | End: 2025-05-07

## 2024-05-08 ENCOUNTER — PATIENT MESSAGE (OUTPATIENT)
Dept: INTERNAL MEDICINE | Facility: CLINIC | Age: 74
End: 2024-05-08
Payer: MEDICARE

## 2024-05-08 DIAGNOSIS — F41.9 ANXIETY: Primary | ICD-10-CM

## 2024-05-08 RX ORDER — BUPROPION HYDROCHLORIDE 150 MG/1
150 TABLET ORAL DAILY
Qty: 10 TABLET | Refills: 0 | Status: SHIPPED | OUTPATIENT
Start: 2024-05-08 | End: 2024-06-01

## 2024-05-18 ENCOUNTER — PATIENT MESSAGE (OUTPATIENT)
Dept: INTERNAL MEDICINE | Facility: CLINIC | Age: 74
End: 2024-05-18
Payer: MEDICARE

## 2024-05-18 DIAGNOSIS — I48.0 PAROXYSMAL ATRIAL FIBRILLATION: ICD-10-CM

## 2024-05-18 DIAGNOSIS — E78.49 OTHER HYPERLIPIDEMIA: Primary | ICD-10-CM

## 2024-05-22 DIAGNOSIS — E03.9 HYPOTHYROIDISM, UNSPECIFIED TYPE: ICD-10-CM

## 2024-05-22 DIAGNOSIS — E78.49 OTHER HYPERLIPIDEMIA: ICD-10-CM

## 2024-05-22 DIAGNOSIS — I10 PRIMARY HYPERTENSION: Primary | ICD-10-CM

## 2024-05-23 ENCOUNTER — TELEPHONE (OUTPATIENT)
Dept: INTERNAL MEDICINE | Facility: CLINIC | Age: 74
End: 2024-05-23
Payer: MEDICARE

## 2024-05-23 NOTE — TELEPHONE ENCOUNTER
----- Message from Uriel Nguyen MA sent at 5/23/2024  9:24 AM CDT -----  Regarding: PV 6/12/24 @ 10:20 Dr. Cornejo  1. Are there any outstanding tasks in the patient's chart? Yes, fasting labs    2. Is there any documentation in the chart? No    3.Has patient been seen in an ER, Urgent care clinic, or been admitted since last visit?  If yes, When, where, and why    4. Has patient seen any other healthcare providers since last visit?  If yes, when, where, and why    5. Has patient had any bloodwork or XR done since last visit?    6. Is patient signed up for patient portal?

## 2024-06-04 ENCOUNTER — PATIENT MESSAGE (OUTPATIENT)
Dept: INTERNAL MEDICINE | Facility: CLINIC | Age: 74
End: 2024-06-04
Payer: MEDICARE

## 2024-06-04 DIAGNOSIS — Z78.0 POSTMENOPAUSAL STATE: Primary | ICD-10-CM

## 2024-06-06 ENCOUNTER — HOSPITAL ENCOUNTER (OUTPATIENT)
Dept: RADIOLOGY | Facility: HOSPITAL | Age: 74
Discharge: HOME | End: 2024-06-06
Attending: INTERNAL MEDICINE
Payer: MEDICARE

## 2024-06-06 DIAGNOSIS — Z78.0 POSTMENOPAUSAL STATE: ICD-10-CM

## 2024-06-06 PROCEDURE — 77080 DXA BONE DENSITY AXIAL: CPT | Mod: 26,,, | Performed by: STUDENT IN AN ORGANIZED HEALTH CARE EDUCATION/TRAINING PROGRAM

## 2024-06-06 PROCEDURE — 77080 DXA BONE DENSITY AXIAL: CPT | Mod: TC

## 2024-06-12 ENCOUNTER — OFFICE VISIT (OUTPATIENT)
Dept: INTERNAL MEDICINE | Facility: CLINIC | Age: 74
End: 2024-06-12
Payer: MEDICARE

## 2024-06-12 ENCOUNTER — TELEPHONE (OUTPATIENT)
Dept: NEUROSURGERY | Facility: CLINIC | Age: 74
End: 2024-06-12
Payer: MEDICARE

## 2024-06-12 VITALS
DIASTOLIC BLOOD PRESSURE: 68 MMHG | OXYGEN SATURATION: 96 % | HEIGHT: 64 IN | WEIGHT: 229 LBS | SYSTOLIC BLOOD PRESSURE: 120 MMHG | BODY MASS INDEX: 39.09 KG/M2 | TEMPERATURE: 98 F | HEART RATE: 88 BPM | RESPIRATION RATE: 16 BRPM

## 2024-06-12 DIAGNOSIS — I25.10 ASCVD (ARTERIOSCLEROTIC CARDIOVASCULAR DISEASE): Primary | ICD-10-CM

## 2024-06-12 DIAGNOSIS — M54.16 LUMBAR RADICULOPATHY: ICD-10-CM

## 2024-06-12 DIAGNOSIS — E66.01 CLASS 2 SEVERE OBESITY DUE TO EXCESS CALORIES WITH SERIOUS COMORBIDITY AND BODY MASS INDEX (BMI) OF 39.0 TO 39.9 IN ADULT: ICD-10-CM

## 2024-06-12 PROCEDURE — 1159F MED LIST DOCD IN RCRD: CPT | Mod: CPTII,,, | Performed by: INTERNAL MEDICINE

## 2024-06-12 PROCEDURE — 3074F SYST BP LT 130 MM HG: CPT | Mod: CPTII,,, | Performed by: INTERNAL MEDICINE

## 2024-06-12 PROCEDURE — 3288F FALL RISK ASSESSMENT DOCD: CPT | Mod: CPTII,,, | Performed by: INTERNAL MEDICINE

## 2024-06-12 PROCEDURE — 3078F DIAST BP <80 MM HG: CPT | Mod: CPTII,,, | Performed by: INTERNAL MEDICINE

## 2024-06-12 PROCEDURE — 4010F ACE/ARB THERAPY RXD/TAKEN: CPT | Mod: CPTII,,, | Performed by: INTERNAL MEDICINE

## 2024-06-12 PROCEDURE — 99214 OFFICE O/P EST MOD 30 MIN: CPT | Mod: ,,, | Performed by: INTERNAL MEDICINE

## 2024-06-12 PROCEDURE — 1125F AMNT PAIN NOTED PAIN PRSNT: CPT | Mod: CPTII,,, | Performed by: INTERNAL MEDICINE

## 2024-06-12 PROCEDURE — 1160F RVW MEDS BY RX/DR IN RCRD: CPT | Mod: CPTII,,, | Performed by: INTERNAL MEDICINE

## 2024-06-12 PROCEDURE — 1101F PT FALLS ASSESS-DOCD LE1/YR: CPT | Mod: CPTII,,, | Performed by: INTERNAL MEDICINE

## 2024-06-12 RX ORDER — SEMAGLUTIDE 0.25 MG/.5ML
0.25 INJECTION, SOLUTION SUBCUTANEOUS WEEKLY
Qty: 8 ML | Refills: 3 | Status: SHIPPED | OUTPATIENT
Start: 2024-06-12

## 2024-06-12 NOTE — TELEPHONE ENCOUNTER
She is previously with Dr. Pedraza and was seen last in our clinic on 2/7/2024.  Please have the patient come in next available with an BYRON with updated AP/lateral/flexion/extension views of the lumbar spine.  Please also inquire if she was seen by Dr. Nathan since she was last in our clinic.  If she was, please attempt to obtain these office notes.  Thank you

## 2024-06-12 NOTE — PROGRESS NOTES
Internal Medicine    Patient ID: [id number]     Chief Complaint: Hyperlipidemia (6 month f/u) and Thyroid Problem (6 month f/u)      HPI:     Ngozi Webb is a 74 y.o. female here today for a follow up.   Follow up  Lots of lumbar back pain  She had a fall in   She had a fall onto her tailbone at that time  Has been in therapy from January till May  Established with Dr Pedraza; was seen by spine ortho in the past had injections that were not successful  Osteopenia; -2.2  History of L4 fracture  LDL at 129; which is improved from her last visit she quit cooking with as much butter and heavy cream  Reports recent a fib attack-- alleviated with extra dose of cardizem   Yogurt for breakfast, atkins protein shake, cheese omelet  Regular meal for lunch; ie spaghetti and red sauce  Grapes  Does not eat dinner    Past Medical History:   Diagnosis Date    DDD (degenerative disc disease), lumbar     Dorsalgia     GERD (gastroesophageal reflux disease)     Hyperlipidemia     Hypertension     Hypothyroidism, unspecified type     Lumbar radiculopathy     Morbid obesity     Osteoarthritis of both knees     Paroxysmal atrial fibrillation         Past Surgical History:   Procedure Laterality Date    BILATERAL MASTECTOMY      per powercharts notes: 10/14/21    BREAST SURGERY  1991     SECTION      CHOLECYSTECTOMY      COSMETIC SURGERY  1991    ESOPHAGOGASTRODUODENOSCOPY  10/04/2021    FRACTURE SURGERY  2017    L4 fracture    HYSTERECTOMY  1974    SPINE SURGERY  2022        Social History     Tobacco Use    Smoking status: Never    Smokeless tobacco: Never   Substance and Sexual Activity    Alcohol use: Never    Drug use: Never    Sexual activity: Yes     Partners: Male     Birth control/protection: Post-menopausal, None        Current Outpatient Medications   Medication Instructions    acetaminophen (TYLENOL) 325 mg, Oral, Every 6 hours PRN    apixaban (ELIQUIS) 5 mg, Oral, 2 times daily    buPROPion  "(WELLBUTRIN XL) 150 mg, Oral    calcium carbonate (CALCIUM 600 ORAL) 1,200 mg, Oral, Daily    cholecalciferol (vitamin D3) (VITAMIN D3) 5,000 Units, Oral, Daily    diltiaZEM (CARDIZEM CD) 120 mg, Oral, Daily    diphenhydrAMINE (BENADRYL) 25 mg, Oral, Every 6 hours PRN    gabapentin (NEURONTIN) 100 mg, Oral, 2 times daily    hydroCHLOROthiazide (HYDRODIURIL) 25 mg, Oral, Daily    levothyroxine (SYNTHROID) 112 mcg, Oral, Daily    lisinopriL 10 MG tablet TAKE 1 TABLET DAILY    magnesium 30 mg, Oral, Daily    melatonin 6 mg, Oral, Nightly    multivitamin (THERAGRAN) per tablet 1 tablet, Oral, Daily    omega-3 fatty acids/fish oil (FISH OIL-OMEGA-3 FATTY ACIDS) 300-1,000 mg capsule Oral, Daily    omeprazole (PRILOSEC) 20 mg, Oral, Daily    rivaroxaban (XARELTO) 20 mg, Oral, With dinner    TURMERIC ORAL 1 capsule, Oral, Daily    UNABLE TO FIND 1 capsule, Oral, Daily, Lecithin    WEGOVY 0.25 mg, Subcutaneous, Weekly       Review of patient's allergies indicates:   Allergen Reactions    Meperidine Nausea Only    Midazolam Nausea And Vomiting     "Makes her sick"          Patient Care Team:  Antwon Knott MD as PCP - General (Internal Medicine)  Ant Jean MD as Consulting Physician (Gastroenterology)     Subjective:     Review of Systems    12 point review of systems conducted, negative except as stated in the history of present illness. See HPI for details.    Objective:     Visit Vitals  /68 (BP Location: Right arm, Patient Position: Sitting, BP Method: Large (Manual))   Pulse 88   Temp 98.3 °F (36.8 °C) (Temporal)   Resp 16   Ht 5' 4" (1.626 m)   Wt 103.9 kg (229 lb)   SpO2 96%   BMI 39.31 kg/m²       Physical Exam  Constitutional:       Appearance: She is obese.   HENT:      Head: Normocephalic and atraumatic.   Eyes:      Extraocular Movements: Extraocular movements intact.      Pupils: Pupils are equal, round, and reactive to light.   Cardiovascular:      Rate and Rhythm: Normal rate and " regular rhythm.   Pulmonary:      Effort: Pulmonary effort is normal.      Breath sounds: Normal breath sounds.   Skin:     General: Skin is warm and dry.   Neurological:      General: No focal deficit present.      Mental Status: She is alert.   Psychiatric:         Mood and Affect: Mood normal.         Labs Reviewed:     Chemistry:  Lab Results   Component Value Date     06/06/2024    K 4.3 06/06/2024    BUN 27.1 (H) 06/06/2024    CREATININE 0.96 06/06/2024    EGFRNORACEVR >60 06/06/2024    GLUCOSE 98 06/06/2024    CALCIUM 9.6 06/06/2024    ALKPHOS 74 06/06/2024    LABPROT 7.4 06/06/2024    ALBUMIN 3.9 06/06/2024    BILIDIR 0.2 10/11/2021    IBILI 0.20 10/11/2021    AST 15 06/06/2024    ALT 7 06/06/2024    EYXOTDCC22VY 67.2 04/07/2021    TSH 2.667 06/06/2024    ECLMIM1FPKQ 1.20 06/06/2024        Lab Results   Component Value Date    HGBA1C 5.1 05/31/2023        Hematology:  Lab Results   Component Value Date    WBC 5.80 12/06/2023    HGB 11.9 (L) 12/06/2023    HCT 36.2 (L) 12/06/2023     12/06/2023       Lipid Panel:  Lab Results   Component Value Date    CHOL 206 (H) 06/06/2024    HDL 67 (H) 06/06/2024    .00 06/06/2024    TRIG 50 06/06/2024    TOTALCHOLEST 3 06/06/2024        Urine:  Lab Results   Component Value Date    APPEARANCEUA Clear 12/06/2023    SGUA 1.021 12/06/2023    PROTEINUA Negative 12/06/2023    KETONESUA Negative 12/06/2023    LEUKOCYTESUR 250 (A) 12/06/2023    RBCUA 0-5 12/06/2023    WBCUA 6-10 (A) 12/06/2023    BACTERIA Trace 12/06/2023    SQEPUA Trace 12/06/2023        Assessment:       ICD-10-CM ICD-9-CM   1. ASCVD (arteriosclerotic cardiovascular disease)  I25.10 429.2     440.9   2. Lumbar radiculopathy  M54.16 724.4   3. Class 2 severe obesity due to excess calories with serious comorbidity and body mass index (BMI) of 39.0 to 39.9 in adult  E66.01 278.01    Z68.39 V85.39        Plan:     1. ASCVD (arteriosclerotic cardiovascular disease)  Assessment & Plan:  Add Wegovy        Orders:  -     semaglutide, weight loss, (WEGOVY) 0.25 mg/0.5 mL PnIj; Inject 0.25 mg into the skin once a week.  Dispense: 8 mL; Refill: 3    2. Lumbar radiculopathy  Assessment & Plan:  Patient would like to revisit with Dr Pedraza      Orders:  -     Ambulatory referral/consult to Neurosurgery; Future; Expected date: 06/19/2024    3. Class 2 severe obesity due to excess calories with serious comorbidity and body mass index (BMI) of 39.0 to 39.9 in adult  Assessment & Plan:  Body mass index is 39.31 kg/m². Morbid obesity complicates all aspects of disease management from diagnostic modalities to treatment. Weight loss encouraged and health benefits explained to patient.                Follow up in about 3 months (around 9/12/2024). In addition to their scheduled follow up, the patient has also been instructed to follow up on as needed basis.     Future Appointments   Date Time Provider Department Center   9/19/2024  1:20 PM Antwon Knott MD [facility] [address]        Antwon Knott MD

## 2024-06-12 NOTE — TELEPHONE ENCOUNTER
Patient is being referred to Dr Pedraza by Dr Knott on 6/12/24 for Lumbar      CT Lumbar 1/10/24 (images/report in chart) impression states:   Chronic L4 vertebral body compression fracture deformity, post kyphoplasty and without interval change since radiographs 10/20/2021.     Interval posterior and left lateral fusion at L3-L4 with osseous fusion across the left facet joint and no osseous fusion across the vertebral bodies.     Post right hemilaminectomy L3-L4 and left hemilaminectomy at L5-S1.  Mild spinal canal stenosis at L4-L5 with no high-grade lumbar spinal canal stenosis.     Chronic degenerative changes as detailed above with no acute pathology identified.  Mild left neural foramen stenosis at L4-L5 and moderate left and mild right neural foramen stenosis at L5-S1.       Xr Lumbar 10/20/2021 (images/report in chart) impression states:  25% loss of vertebral body height at L4, with underlying kyphoplasty cement.    Advanced L5-S1 degenerative disc disease.    Moderate lower lumbar spine facet arthropathy.        Please review and advise re scheduling. Thank you

## 2024-06-12 NOTE — ASSESSMENT & PLAN NOTE
Body mass index is 39.31 kg/m². Morbid obesity complicates all aspects of disease management from diagnostic modalities to treatment. Weight loss encouraged and health benefits explained to patient.

## 2024-06-13 DIAGNOSIS — M54.41 CHRONIC BILATERAL LOW BACK PAIN WITH BILATERAL SCIATICA: ICD-10-CM

## 2024-06-13 DIAGNOSIS — G89.29 CHRONIC BILATERAL LOW BACK PAIN WITH BILATERAL SCIATICA: ICD-10-CM

## 2024-06-13 DIAGNOSIS — M51.36 DEGENERATIVE DISC DISEASE, LUMBAR: Primary | ICD-10-CM

## 2024-06-13 DIAGNOSIS — M54.42 CHRONIC BILATERAL LOW BACK PAIN WITH BILATERAL SCIATICA: ICD-10-CM

## 2024-06-13 DIAGNOSIS — Z98.1 S/P LUMBAR FUSION: ICD-10-CM

## 2024-06-13 NOTE — TELEPHONE ENCOUNTER
I tried to call the patient to inquire about the referral we did receive. Her  answered the phone and stated the patient is at her doctors appointment right now. He will have her return my call once she is done.

## 2024-06-13 NOTE — TELEPHONE ENCOUNTER
I spoke with the patient to see if she has contacted Dr. Brown's office after she has completed her physical therapy program to see if she is a candidate for a lumbar epidural steroid injection. She stated she does not remember Dr. Pedraza saying that although she has had injections with him in the past which did not help. She is wanting to be referred to Dr. Pedraza's recommended PM doctor. I told her we usually refer out to Dr. Mas. She is requesting I put in that referral for her. Referral has been submitted per Rafi. I advised the patient to give our office a call back if she changes her mind or if the injections do not help. She verbalized understanding.

## 2024-07-02 ENCOUNTER — OFFICE VISIT (OUTPATIENT)
Dept: PAIN MEDICINE | Facility: CLINIC | Age: 74
End: 2024-07-02
Payer: MEDICARE

## 2024-07-02 VITALS
BODY MASS INDEX: 39.09 KG/M2 | DIASTOLIC BLOOD PRESSURE: 69 MMHG | WEIGHT: 229 LBS | SYSTOLIC BLOOD PRESSURE: 132 MMHG | HEIGHT: 64 IN | HEART RATE: 79 BPM

## 2024-07-02 DIAGNOSIS — M54.9 CHRONIC BACK PAIN GREATER THAN 3 MONTHS DURATION: ICD-10-CM

## 2024-07-02 DIAGNOSIS — G89.29 CHRONIC BACK PAIN GREATER THAN 3 MONTHS DURATION: ICD-10-CM

## 2024-07-02 DIAGNOSIS — M51.36 DEGENERATIVE DISC DISEASE, LUMBAR: ICD-10-CM

## 2024-07-02 DIAGNOSIS — M54.42 CHRONIC BILATERAL LOW BACK PAIN WITH BILATERAL SCIATICA: ICD-10-CM

## 2024-07-02 DIAGNOSIS — M54.16 LUMBAR RADICULOPATHY: Primary | ICD-10-CM

## 2024-07-02 DIAGNOSIS — G89.29 CHRONIC BILATERAL LOW BACK PAIN WITH BILATERAL SCIATICA: ICD-10-CM

## 2024-07-02 DIAGNOSIS — M54.41 CHRONIC BILATERAL LOW BACK PAIN WITH BILATERAL SCIATICA: ICD-10-CM

## 2024-07-02 NOTE — LETTER
1950  Ngozi Webb     To: Dr. Antwon Knott     Procedure right transforaminal epidural steroid injection at S1  on 2024    The above patient is being scheduled for a procedure under fluoroscopy with iodine that will require to discontinue one or more of the following medications:      XXX   eliquis for 7 days    Please check one:    If patient is on Coumadin, will he/she need Lovenox in the interim?    ____  Yes        ____ No      Please check one:    ________  I will manage the Lovenox prescription before and after the injection.      Approved by: ___________________________  Date: ______________      Denied by: _____________________________  Date: ______________     [facility]  Kaylie Thapa MD  [address]   [address]  [place] [address]    M# [phone]  F# [phone]

## 2024-07-02 NOTE — PROGRESS NOTES
Kaylie Mas MD        PATIENT NAME: Ngozi Webb  : 1950  DATE: 24  MRN: [id number]      Billing Provider: Kaylie Mas MD  Level of Service:   Patient PCP Information       Provider PCP Type    Antwon Knott MD General            Reason for Visit / Chief Complaint: Low-back Pain (DDD lumbar chronic lower back pain,  referral from Dr Milo HANSON/O pain level 7, Taking 100 mg Gabapentin a day supposed to take 300 mg but makes her sleepy takes Tylenol also. Pt had fall in   had sx on L5, 2017 had sx on L4 from another  had sx on L4 also.)       Update PCP  Update Chief Complaint         History of Present Illness / Problem Focused Workflow     Ngozi Webb presents to the clinic with Low-back Pain (DDD lumbar chronic lower back pain,  referral from Dr Milo HANSON/O pain level 7, Taking 100 mg Gabapentin a day supposed to take 300 mg but makes her sleepy takes Tylenol also. Pt had fall in   had sx on L5, 2017 had sx on L4 from another  had sx on L4 also.)     This is a 74-year-old female who presents to clinic today for her initial consultation as a referral from Dr. Pedraza.  She complains of low back pain that radiates down the right lower extremity into her foot.  She has had this pain for many years.  She is s/p a lumbar laminectomy in  and s/p L4 kyphoplasty in  in [place].  In addition, the patient is s/p a L3-4 left interbody fusion (DePuy) and L3-4 PLIF (K2M) on 2022 by Dr. Lam in [place].  She states that she did well for a few months after the surgery, but then her pain started to get worse again.  Gabapentin makes her sleepy, so she only takes a low dose at bedtime.  She also takes Tylenol Arthritis at bedtime.  She has seen a pain management physician in the past and undergone right L5 transforaminal epidural steroid injections in 2022 and 2022, which she states never really helped.      Low-back  Pain  Associated symptoms include leg pain.       Review of Systems     Review of Systems   Musculoskeletal:  Positive for back pain and leg pain.   Psychiatric/Behavioral:  Positive for sleep disturbance.    All other systems reviewed and are negative.       Medical / Social / Family History     Past Medical History:   Diagnosis Date    DDD (degenerative disc disease), lumbar     Dorsalgia     GERD (gastroesophageal reflux disease)     Hyperlipidemia     Hypertension     Hypothyroidism, unspecified type     Lumbar radiculopathy     Morbid obesity     Osteoarthritis of both knees     Paroxysmal atrial fibrillation        Past Surgical History:   Procedure Laterality Date    BILATERAL MASTECTOMY      per powercharts notes: 10/14/21    BREAST SURGERY  1991     SECTION      CHOLECYSTECTOMY      COSMETIC SURGERY  1991    ESOPHAGOGASTRODUODENOSCOPY  10/04/2021    FRACTURE SURGERY  2017    L4 fracture    HYSTERECTOMY  1974    SPINE SURGERY  2022       Social History  Ms. Webb  reports that she has never smoked. She has never used smokeless tobacco. She reports that she does not drink alcohol and does not use drugs.    Family History  Ms.'s Webb family history includes Alcohol abuse in her father; COPD in her sister; Depression in her daughter and daughter; Diabetes in her brother and sister; Early death in her father; Hypertension in her father; Stroke in her father and mother; Uterine cancer in her mother.    Medications and Allergies     Medications  Outpatient Medications Marked as Taking for the 24 encounter (Office Visit) with Kaylie Mas MD   Medication Sig Dispense Refill    acetaminophen (TYLENOL) 325 MG tablet Take 325 mg by mouth every 6 (six) hours as needed for Pain.      apixaban (ELIQUIS) 5 mg Tab Take 1 tablet (5 mg total) by mouth 2 (two) times daily. 180 tablet 3    buPROPion (WELLBUTRIN XL) 150 MG TB24 tablet TAKE 1 TABLET DAILY 90 tablet 3    calcium carbonate  "(CALCIUM 600 ORAL) Take 1,200 mg by mouth Daily.      cholecalciferol, vitamin D3, (VITAMIN D3) 50 mcg (2,000 unit) Cap capsule Take 5,000 Units by mouth Daily.      diltiaZEM (CARDIZEM CD) 120 MG Cp24 Take 120 mg by mouth once daily.      diphenhydrAMINE (BENADRYL) 25 mg capsule Take 25 mg by mouth every 6 (six) hours as needed for Itching.      gabapentin (NEURONTIN) 100 MG capsule Take 1 capsule (100 mg total) by mouth 2 (two) times daily. (Patient taking differently: Take 100 mg by mouth 2 (two) times daily as needed (Pain).) 180 capsule 3    hydroCHLOROthiazide (HYDRODIURIL) 25 MG tablet Take 1 tablet (25 mg total) by mouth once daily. 30 tablet 11    levothyroxine (SYNTHROID) 112 MCG tablet Take 1 tablet (112 mcg total) by mouth once daily. 90 tablet 3    lisinopriL 10 MG tablet TAKE 1 TABLET DAILY 90 tablet 3    magnesium 30 mg Tab Take 30 mg by mouth Daily.      melatonin 3 mg Cap Take 6 mg by mouth nightly. (Patient taking differently: Take 8 mg by mouth nightly.)      multivitamin (THERAGRAN) per tablet Take 1 tablet by mouth once daily.      omega-3 fatty acids/fish oil (FISH OIL-OMEGA-3 FATTY ACIDS) 300-1,000 mg capsule Take by mouth once daily.      omeprazole (PRILOSEC) 20 MG capsule Take 20 mg by mouth once daily.      semaglutide, weight loss, (WEGOVY) 0.25 mg/0.5 mL PnIj Inject 0.25 mg into the skin once a week. 8 mL 3    TURMERIC ORAL Take 1 capsule by mouth Daily.      UNABLE TO FIND Take 1 capsule by mouth Daily. Lecithin      [DISCONTINUED] rivaroxaban (XARELTO) 20 mg Tab Take 1 tablet (20 mg total) by mouth daily with dinner or evening meal. 30 tablet 0       Allergies  Review of patient's allergies indicates:   Allergen Reactions    Meperidine Nausea Only    Midazolam Nausea And Vomiting     "Makes her sick"         Physical Examination     Vitals:    07/02/24 0831   BP: 132/69   Pulse: 79     Pain Disability Index (PDI): 52       Spine Musculoskeletal Exam    Gait    Gait is normal.    Range " of Motion    Thoracolumbar        Thoracolumbar range of motion additional comments: Restricted range of motion in the lumbar spine due to pain.    Strength    Thoracolumbar    Thoracolumbar motor exam is normal.       Sensory    Thoracolumbar    Thoracolumbar sensation is normal.    General      Constitutional: appears stated age, well-developed and well-nourished    Scleral icterus: no    Labored breathing: no    Psychiatric: normal mood and affect and no acute distress    Neurological: alert and oriented x3    Skin: intact    Lymphadenopathy: none  CV: extremities warm, well-perfused  Resp: nonlabored breathing    Assessment and Plan (including Health Maintenance)      Problem List  Smart Sets  Document Outside HM   :    Plan:   Lumbar radiculopathy    Degenerative disc disease, lumbar  -     Ambulatory referral/consult to Pain Clinic    Chronic bilateral low back pain with bilateral sciatica  -     Ambulatory referral/consult to Pain Clinic    Chronic back pain greater than 3 months duration       She is being scheduled for right S1 transforaminal epidural steroid injections today to help with her chronic low back pain radiating down the right lower extremity into her foot, consistent with findings of degenerative disc disease seen on her MRI.  The plan was discussed with the patient and she wishes to proceed.  We will need to obtain clearance from her cardiologist to hold Eliquis prior to the injection.    Problem List Items Addressed This Visit          Neuro    Degenerative disc disease, lumbar    Lumbar radiculopathy - Primary       Orthopedic    Chronic back pain greater than 3 months duration     Other Visit Diagnoses       Chronic bilateral low back pain with bilateral sciatica                  Future Appointments   Date Time Provider Department Center   9/19/2024  1:20 PM Antwon Knott MD [facility] [address] [address]        There are no Patient Instructions on file for this visit.  No follow-ups  on file.     Signature:  Kaylie Mas MD      Date of encounter: 7/2/24

## 2024-07-02 NOTE — LETTER
Cardiac Risk Assessment Request Form      Date: 7/03/2024  Dr Kurtz    Patient's Name: Ngozi Webb      YOB: 1950    Patient is scheduled to have Right transforaminal epidural steroid injection at S1  on 8/05/2024 by Dr. Kaylie Mas with:    XX  MAC    Dx: lumbar degenerative disc disease       Requesting staff name: Esperanza      Phone number: [phone]        Fax number: [phone]      Check all that apply and complete blanks:    XX  Request for cardiac risk assessment for procedure    XX Request to hold _Eliquis___ for __7__ days prior to procedure          Please fax document back to [phone] or [phone]  ATTN: [facility] ([facility]) and allow at lease 3 business days to receive a response from [facility].  According to [employer] cardiac risk assessment, low risk surgeries do not need cardiac testing or risk stratification. Patients that are asymptomatic should safely proceed with low risk procedures that may include, but are not limited to dental procedure, minor skin procedures, EGD, Colonoscopy or Cataracts.  Symptomatic patients should make an appointment with [facility].

## 2024-07-05 ENCOUNTER — TELEPHONE (OUTPATIENT)
Dept: PAIN MEDICINE | Facility: CLINIC | Age: 74
End: 2024-07-05
Payer: MEDICARE

## 2024-07-05 NOTE — TELEPHONE ENCOUNTER
----- Message from Antwon Knott MD sent at 7/2/2024 12:05 PM CDT -----  Regarding: Afib- Eliquis  Eliquis we only need to hold for 48 hours; rarely we may hold for 72 hours.     And if this is for primary AFib the recommendation should ideally be coming from the cardiologist not from the PCP; but even if the patient is on Coumadin if the anti-coag indication is just for AFib they do not require any bridging with Lovenox.    Let me know if there is any questions    Dr Cornejo  ----- Message -----  From: Esperanza Goodson MA  Sent: 7/2/2024  10:03 AM CDT  To: Antwon Knott MD  Subject: Other

## 2024-08-05 ENCOUNTER — HOSPITAL ENCOUNTER (OUTPATIENT)
Facility: HOSPITAL | Age: 74
Discharge: HOME | End: 2024-08-05
Attending: ANESTHESIOLOGY | Admitting: ANESTHESIOLOGY
Payer: MEDICARE

## 2024-08-05 ENCOUNTER — ANESTHESIA (OUTPATIENT)
Dept: SURGERY | Facility: HOSPITAL | Age: 74
End: 2024-08-05
Payer: MEDICARE

## 2024-08-05 ENCOUNTER — PATIENT MESSAGE (OUTPATIENT)
Dept: ADMINISTRATIVE | Facility: OTHER | Age: 74
End: 2024-08-05
Payer: MEDICARE

## 2024-08-05 ENCOUNTER — ANESTHESIA EVENT (OUTPATIENT)
Dept: SURGERY | Facility: HOSPITAL | Age: 74
End: 2024-08-05
Payer: MEDICARE

## 2024-08-05 DIAGNOSIS — M54.9 CHRONIC BACK PAIN GREATER THAN 3 MONTHS DURATION: ICD-10-CM

## 2024-08-05 DIAGNOSIS — G89.29 CHRONIC BACK PAIN GREATER THAN 3 MONTHS DURATION: ICD-10-CM

## 2024-08-05 PROCEDURE — 63600175 PHARM REV CODE 636 W HCPCS: Performed by: NURSE ANESTHETIST, CERTIFIED REGISTERED

## 2024-08-05 PROCEDURE — 63600175 PHARM REV CODE 636 W HCPCS: Mod: JZ,JG | Performed by: ANESTHESIOLOGY

## 2024-08-05 PROCEDURE — 25500020 PHARM REV CODE 255: Performed by: ANESTHESIOLOGY

## 2024-08-05 PROCEDURE — 64483 NJX AA&/STRD TFRM EPI L/S 1: CPT | Mod: RT,,, | Performed by: ANESTHESIOLOGY

## 2024-08-05 PROCEDURE — D9220A PRA ANESTHESIA: Mod: ANES,,, | Performed by: ANESTHESIOLOGY

## 2024-08-05 PROCEDURE — 64483 NJX AA&/STRD TFRM EPI L/S 1: CPT | Mod: RT | Performed by: ANESTHESIOLOGY

## 2024-08-05 PROCEDURE — D9220A PRA ANESTHESIA: Mod: CRNA,,, | Performed by: NURSE ANESTHETIST, CERTIFIED REGISTERED

## 2024-08-05 PROCEDURE — 37000008 HC ANESTHESIA 1ST 15 MINUTES: Performed by: ANESTHESIOLOGY

## 2024-08-05 PROCEDURE — 25000003 PHARM REV CODE 250: Performed by: ANESTHESIOLOGY

## 2024-08-05 RX ORDER — BUPIVACAINE HYDROCHLORIDE 2.5 MG/ML
INJECTION, SOLUTION EPIDURAL; INFILTRATION; INTRACAUDAL
Status: DISCONTINUED
Start: 2024-08-05 | End: 2024-08-05 | Stop reason: HOSPADM

## 2024-08-05 RX ORDER — LIDOCAINE HYDROCHLORIDE 10 MG/ML
1 INJECTION, SOLUTION EPIDURAL; INFILTRATION; INTRACAUDAL; PERINEURAL ONCE
OUTPATIENT
Start: 2024-08-05 | End: 2024-08-05

## 2024-08-05 RX ORDER — LIDOCAINE HYDROCHLORIDE 10 MG/ML
INJECTION, SOLUTION EPIDURAL; INFILTRATION; INTRACAUDAL; PERINEURAL
Status: DISCONTINUED
Start: 2024-08-05 | End: 2024-08-05 | Stop reason: HOSPADM

## 2024-08-05 RX ORDER — GLUCAGON 1 MG
1 KIT INJECTION
OUTPATIENT
Start: 2024-08-05

## 2024-08-05 RX ORDER — DEXAMETHASONE SODIUM PHOSPHATE 10 MG/ML
INJECTION INTRAMUSCULAR; INTRAVENOUS
Status: DISCONTINUED | OUTPATIENT
Start: 2024-08-05 | End: 2024-08-05 | Stop reason: HOSPADM

## 2024-08-05 RX ORDER — DEXAMETHASONE SODIUM PHOSPHATE 10 MG/ML
INJECTION INTRAMUSCULAR; INTRAVENOUS
Status: DISCONTINUED
Start: 2024-08-05 | End: 2024-08-05 | Stop reason: HOSPADM

## 2024-08-05 RX ORDER — IOPAMIDOL 408 MG/ML
INJECTION, SOLUTION INTRATHECAL
Status: DISCONTINUED | OUTPATIENT
Start: 2024-08-05 | End: 2024-08-05 | Stop reason: HOSPADM

## 2024-08-05 RX ORDER — ONDANSETRON HYDROCHLORIDE 2 MG/ML
4 INJECTION, SOLUTION INTRAVENOUS DAILY PRN
OUTPATIENT
Start: 2024-08-05

## 2024-08-05 RX ORDER — SODIUM CHLORIDE, SODIUM GLUCONATE, SODIUM ACETATE, POTASSIUM CHLORIDE AND MAGNESIUM CHLORIDE 30; 37; 368; 526; 502 MG/100ML; MG/100ML; MG/100ML; MG/100ML; MG/100ML
INJECTION, SOLUTION INTRAVENOUS CONTINUOUS
OUTPATIENT
Start: 2024-08-05 | End: 2024-09-04

## 2024-08-05 RX ORDER — ACETAMINOPHEN 10 MG/ML
1000 INJECTION, SOLUTION INTRAVENOUS ONCE
OUTPATIENT
Start: 2024-08-05 | End: 2024-08-05

## 2024-08-05 RX ORDER — DIPHENHYDRAMINE HYDROCHLORIDE 50 MG/ML
25 INJECTION INTRAMUSCULAR; INTRAVENOUS EVERY 6 HOURS PRN
OUTPATIENT
Start: 2024-08-05

## 2024-08-05 RX ORDER — HYDROMORPHONE HYDROCHLORIDE 2 MG/ML
0.2 INJECTION, SOLUTION INTRAMUSCULAR; INTRAVENOUS; SUBCUTANEOUS EVERY 5 MIN PRN
OUTPATIENT
Start: 2024-08-05

## 2024-08-05 RX ORDER — BUPIVACAINE HYDROCHLORIDE 2.5 MG/ML
INJECTION, SOLUTION EPIDURAL; INFILTRATION; INTRACAUDAL
Status: DISCONTINUED | OUTPATIENT
Start: 2024-08-05 | End: 2024-08-05 | Stop reason: HOSPADM

## 2024-08-05 RX ORDER — PROPOFOL 10 MG/ML
VIAL (ML) INTRAVENOUS
Status: DISCONTINUED | OUTPATIENT
Start: 2024-08-05 | End: 2024-08-05

## 2024-08-05 RX ORDER — LIDOCAINE HYDROCHLORIDE 10 MG/ML
INJECTION, SOLUTION EPIDURAL; INFILTRATION; INTRACAUDAL; PERINEURAL
Status: DISCONTINUED | OUTPATIENT
Start: 2024-08-05 | End: 2024-08-05 | Stop reason: HOSPADM

## 2024-08-05 RX ADMIN — PROPOFOL 70 MG: 10 INJECTION, EMULSION INTRAVENOUS at 10:08

## 2024-08-06 ENCOUNTER — PATIENT MESSAGE (OUTPATIENT)
Dept: INTERNAL MEDICINE | Facility: CLINIC | Age: 74
End: 2024-08-06
Payer: MEDICARE

## 2024-08-06 ENCOUNTER — PATIENT MESSAGE (OUTPATIENT)
Dept: ADMINISTRATIVE | Facility: OTHER | Age: 74
End: 2024-08-06
Payer: MEDICARE

## 2024-08-07 ENCOUNTER — PATIENT MESSAGE (OUTPATIENT)
Dept: ADMINISTRATIVE | Facility: OTHER | Age: 74
End: 2024-08-07
Payer: MEDICARE

## 2024-08-07 VITALS
OXYGEN SATURATION: 98 % | WEIGHT: 231.5 LBS | HEART RATE: 74 BPM | TEMPERATURE: 98 F | HEIGHT: 64 IN | SYSTOLIC BLOOD PRESSURE: 148 MMHG | DIASTOLIC BLOOD PRESSURE: 75 MMHG | RESPIRATION RATE: 20 BRPM | BODY MASS INDEX: 39.52 KG/M2

## 2024-08-20 ENCOUNTER — OFFICE VISIT (OUTPATIENT)
Facility: CLINIC | Age: 74
End: 2024-08-20
Payer: MEDICARE

## 2024-08-20 VITALS
SYSTOLIC BLOOD PRESSURE: 134 MMHG | WEIGHT: 231.5 LBS | BODY MASS INDEX: 39.52 KG/M2 | DIASTOLIC BLOOD PRESSURE: 60 MMHG | HEIGHT: 64 IN | HEART RATE: 78 BPM

## 2024-08-20 DIAGNOSIS — G89.29 CHRONIC BACK PAIN GREATER THAN 3 MONTHS DURATION: ICD-10-CM

## 2024-08-20 DIAGNOSIS — M51.36 DEGENERATIVE DISC DISEASE, LUMBAR: Primary | ICD-10-CM

## 2024-08-20 DIAGNOSIS — M54.9 CHRONIC BACK PAIN GREATER THAN 3 MONTHS DURATION: ICD-10-CM

## 2024-08-20 DIAGNOSIS — M54.16 LUMBAR RADICULOPATHY: ICD-10-CM

## 2024-08-20 PROCEDURE — 1101F PT FALLS ASSESS-DOCD LE1/YR: CPT | Mod: CPTII,,, | Performed by: ANESTHESIOLOGY

## 2024-08-20 PROCEDURE — 1160F RVW MEDS BY RX/DR IN RCRD: CPT | Mod: CPTII,,, | Performed by: ANESTHESIOLOGY

## 2024-08-20 PROCEDURE — 1159F MED LIST DOCD IN RCRD: CPT | Mod: CPTII,,, | Performed by: ANESTHESIOLOGY

## 2024-08-20 PROCEDURE — 99213 OFFICE O/P EST LOW 20 MIN: CPT | Mod: ,,, | Performed by: ANESTHESIOLOGY

## 2024-08-20 PROCEDURE — 3078F DIAST BP <80 MM HG: CPT | Mod: CPTII,,, | Performed by: ANESTHESIOLOGY

## 2024-08-20 PROCEDURE — 4010F ACE/ARB THERAPY RXD/TAKEN: CPT | Mod: CPTII,,, | Performed by: ANESTHESIOLOGY

## 2024-08-20 PROCEDURE — 3075F SYST BP GE 130 - 139MM HG: CPT | Mod: CPTII,,, | Performed by: ANESTHESIOLOGY

## 2024-08-20 PROCEDURE — 3288F FALL RISK ASSESSMENT DOCD: CPT | Mod: CPTII,,, | Performed by: ANESTHESIOLOGY

## 2024-08-20 NOTE — PROGRESS NOTES
Kaylie Mas MD        PATIENT NAME: Ngozi Webb  : 1950  DATE: 24  MRN: [id number]      Billing Provider: Kaylie Mas MD  Level of Service:   Patient PCP Information       Provider PCP Type    Antwon Knott MD General            Reason for Visit / Chief Complaint: Leg Pain ( post-op Right TFESI S1 24--mt 24- pt reports pain level 4. Pt takes tylenol at bedtime. Pt also takes gabapentin around 5:30ish )       Update PCP  Update Chief Complaint         History of Present Illness / Problem Focused Workflow     Ngozi Webb presents to the clinic with Leg Pain ( post-op Right TFESI S1 24--mt 24- pt reports pain level 4. Pt takes tylenol at bedtime. Pt also takes gabapentin around 5:30ish )     This is a 74-year-old female who presents to clinic today for follow up of low back pain that radiates down the right lower extremity into her foot.  She has had this pain for many years.  She is s/p a lumbar laminectomy in  and s/p L4 kyphoplasty in 2017 in [place].  In addition, the patient is s/p a L3-4 left interbody fusion (DePuy) and L3-4 PLIF (K2M) on 2022 by Dr. Lam in [place].  She states that she did well for a few months after the surgery, but then her pain started to get worse again.  Gabapentin makes her sleepy, so she only takes a low dose at bedtime.  She also takes Tylenol Arthritis at bedtime.  She has seen a pain management physician in the past and undergone right L5 transforaminal epidural steroid injections in 2022 and 2022, which she states never really helped.  She underwent a right S1 transforaminal epidural steroid injection about 2 weeks ago, that has provided about 20% relief.  She cannot sit or stand for too long.  She does not have any pain at night when she is lying down; she sleeps well.      Low-back Pain  Associated symptoms include leg pain.   Leg Pain         Review of Systems     Review of  Systems   Musculoskeletal:  Positive for back pain and leg pain.   Psychiatric/Behavioral:  Positive for sleep disturbance.    All other systems reviewed and are negative.       Medical / Social / Family History     Past Medical History:   Diagnosis Date    DDD (degenerative disc disease), lumbar     Dorsalgia     GERD (gastroesophageal reflux disease)     Hyperlipidemia     Hypertension     Hypothyroidism, unspecified type     Lumbar radiculopathy     Morbid obesity     EMMANUEL (obstructive sleep apnea)     does not wear cpap, wears oral device    Osteoarthritis of both knees     Paroxysmal atrial fibrillation        Past Surgical History:   Procedure Laterality Date    BILATERAL MASTECTOMY  1991    per powercharts notes: 10/14/21     SECTION      CHOLECYSTECTOMY      COSMETIC SURGERY  1991    ESOPHAGOGASTRODUODENOSCOPY  10/04/2021    FRACTURE SURGERY  2017    L4 fracture    HYSTERECTOMY  1974    SPINE SURGERY  2022    L4    TRANSFORAMINAL EPIDURAL INJECTION OF STEROID Right 2024    Procedure: INJECTION, STEROID, EPIDURAL, TRANSFORAMINAL APPROACH  /// right S1;  Surgeon: Kaylie Mas MD;  [facility];  Service: Pain Management;  Laterality: Right;  Right TFESI S1       Social History  Ms. Webb  reports that she has never smoked. She has never used smokeless tobacco. She reports that she does not drink alcohol and does not use drugs.    Family History  Ms.'s Webb family history includes Alcohol abuse in her father; COPD in her sister; Depression in her daughter and daughter; Diabetes in her brother and sister; Early death in her father; Hypertension in her father; Stroke in her father and mother; Uterine cancer in her mother.    Medications and Allergies     Medications  Outpatient Medications Marked as Taking for the 24 encounter (Office Visit) with Kaylie Mas MD   Medication Sig Dispense Refill    acetaminophen (TYLENOL) 325 MG tablet Take 325 mg by mouth every 6  "(six) hours as needed for Pain.      apixaban (ELIQUIS) 5 mg Tab Take 1 tablet (5 mg total) by mouth 2 (two) times daily. 180 tablet 3    buPROPion (WELLBUTRIN XL) 150 MG TB24 tablet TAKE 1 TABLET DAILY 90 tablet 3    calcium carbonate (CALCIUM 600 ORAL) Take 1,200 mg by mouth Daily.      cholecalciferol, vitamin D3, (VITAMIN D3) 50 mcg (2,000 unit) Cap capsule Take 5,000 Units by mouth Daily.      diltiaZEM (CARDIZEM CD) 120 MG Cp24 Take 120 mg by mouth once daily.      diphenhydrAMINE (BENADRYL) 25 mg capsule Take 25 mg by mouth every 6 (six) hours as needed for Itching.      gabapentin (NEURONTIN) 100 MG capsule Take 1 capsule (100 mg total) by mouth 2 (two) times daily. (Patient taking differently: Take 100 mg by mouth 2 (two) times daily as needed (Pain).) 180 capsule 3    hydroCHLOROthiazide (HYDRODIURIL) 25 MG tablet Take 1 tablet (25 mg total) by mouth once daily. 30 tablet 11    levothyroxine (SYNTHROID) 112 MCG tablet Take 1 tablet (112 mcg total) by mouth once daily. 90 tablet 3    lisinopriL 10 MG tablet TAKE 1 TABLET DAILY 90 tablet 3    magnesium 30 mg Tab Take 30 mg by mouth Daily.      melatonin 3 mg Cap Take 6 mg by mouth nightly. (Patient taking differently: Take 8 mg by mouth nightly.)      multivitamin (THERAGRAN) per tablet Take 1 tablet by mouth once daily.      omega-3 fatty acids/fish oil (FISH OIL-OMEGA-3 FATTY ACIDS) 300-1,000 mg capsule Take by mouth once daily.      omeprazole (PRILOSEC) 20 MG capsule Take 20 mg by mouth once daily.      TURMERIC ORAL Take 1 capsule by mouth Daily.         Allergies  Review of patient's allergies indicates:   Allergen Reactions    Meperidine Nausea Only    Midazolam Nausea And Vomiting     "Makes her sick"         Physical Examination     Vitals:    08/20/24 1106   BP: 134/60   Pulse: 78             Spine Musculoskeletal Exam    Gait    Gait is normal.    Range of Motion    Thoracolumbar        Thoracolumbar range of motion additional comments: Restricted " range of motion in the lumbar spine due to pain.    Strength    Thoracolumbar    Thoracolumbar motor exam is normal.       Sensory    Thoracolumbar    Thoracolumbar sensation is normal.    General      Constitutional: appears stated age, well-developed and well-nourished    Scleral icterus: no    Labored breathing: no    Psychiatric: normal mood and affect and no acute distress    Neurological: alert and oriented x3    Skin: intact    Lymphadenopathy: none  CV: extremities warm, well-perfused  Resp: nonlabored breathing    Assessment and Plan (including Health Maintenance)      Problem List  Smart Sets  Document Outside HM   :    Plan:   Degenerative disc disease, lumbar    Lumbar radiculopathy    Chronic back pain greater than 3 months duration       She is doing a little bit better overall since undergoing a right S1 transforaminal epidural steroid injection a couple of weeks ago.  I will plan to follow up with her again in 3-4 months, or sooner if needed.  We discussed that the injections can be repeated as needed.    Problem List Items Addressed This Visit          Neuro    Degenerative disc disease, lumbar - Primary    Lumbar radiculopathy       Orthopedic    Chronic back pain greater than 3 months duration         Future Appointments   Date Time Provider Department Center   9/19/2024  1:20 PM Antwon Knott MD [id number]   11/20/2024 11:15 AM [facility]        There are no Patient Instructions on file for this visit.  Follow up in about 3 months (around 11/20/2024).     Signature:  Kaylie Mas MD      Date of encounter: 8/20/24

## 2024-09-05 ENCOUNTER — TELEPHONE (OUTPATIENT)
Dept: INTERNAL MEDICINE | Facility: CLINIC | Age: 74
End: 2024-09-05
Payer: MEDICARE

## 2024-09-05 DIAGNOSIS — I25.10 ASCVD (ARTERIOSCLEROTIC CARDIOVASCULAR DISEASE): Primary | ICD-10-CM

## 2024-09-05 NOTE — TELEPHONE ENCOUNTER
----- Message from Uriel Nguyen MA sent at 9/5/2024 12:30 PM CDT -----  Regarding: PV 9/19/24 @ 1:20 Dr. Cornejo  1. Are there any outstanding tasks in the patient's chart? Yes, fasting labs    2. Is there any documentation in the chart? No    3.Has patient been seen in an ER, Urgent care clinic, or been admitted since last visit?  If yes, When, where, and why    4. Has patient seen any other healthcare providers since last visit?  If yes, when, where, and why    5. Has patient had any bloodwork or XR done since last visit?    6. Is patient signed up for patient portal?

## 2024-09-12 ENCOUNTER — TELEPHONE (OUTPATIENT)
Dept: INTERNAL MEDICINE | Facility: CLINIC | Age: 74
End: 2024-09-12
Payer: MEDICARE

## 2024-09-12 NOTE — TELEPHONE ENCOUNTER
----- Message from Sydni Ibanez sent at 9/12/2024 11:23 AM CDT -----  Regarding: med advice  Who Called: Ngozi Webb    Caller is requesting assistance/information from provider's office.    Symptoms (please be specific):    How long has patient had these symptoms:    List of preferred pharmacies on file (remove unneeded): [unfilled]  If different, enter pharmacy into here including location and phone number:       Preferred Method of Contact: Phone Call  Patient's Preferred Phone Number on File: [phone]   Best Call Back Number, if different:  Additional Information: pt is requesting a call back from Uriel regarding labs

## 2024-10-22 ENCOUNTER — PATIENT MESSAGE (OUTPATIENT)
Dept: INTERNAL MEDICINE | Facility: CLINIC | Age: 74
End: 2024-10-22
Payer: MEDICARE

## 2024-11-04 ENCOUNTER — TELEPHONE (OUTPATIENT)
Dept: INTERNAL MEDICINE | Facility: CLINIC | Age: 74
End: 2024-11-04
Payer: MEDICARE

## 2024-11-04 NOTE — TELEPHONE ENCOUNTER
Patient appt scheduled on 11/06@ 11 am   Simona Harrison is a 34year old female here presenting with:    Reported symptoms: right ear pain and headache onset this am. States she initially noted stuffy nose, sore throat and left ear pain yesterday.     Symptoms present for: since Monday    OTC medications/Home remedy: none    Work note needed: no    Patient would like communication of their results via:    Annmarie Gross

## 2024-11-04 NOTE — TELEPHONE ENCOUNTER
----- Message from Riffyn sent at 11/4/2024  8:32 AM CST -----  .Who Called: Ngozi Webb    Caller is requesting assistance/information from provider's office.    Symptoms (please be specific): n/a   How long has patient had these symptoms:  n/a  List of preferred pharmacies on file (remove unneeded): [unfilled]  If different, enter pharmacy into here including location and phone number: n/a      Preferred Method of Contact: Phone Call  Patient's Preferred Phone Number on File: [phone]   Best Call Back Number, if different:  Additional Information: Pt is calling because she would like to be scheduled for a nurse visit to  her flu shot. Please call to advise

## 2024-11-06 ENCOUNTER — CLINICAL SUPPORT (OUTPATIENT)
Dept: INTERNAL MEDICINE | Facility: CLINIC | Age: 74
End: 2024-11-06
Payer: MEDICARE

## 2024-11-06 DIAGNOSIS — Z23 NEED FOR VACCINATION: Primary | ICD-10-CM

## 2024-11-06 PROCEDURE — 90653 IIV ADJUVANT VACCINE IM: CPT | Mod: ,,, | Performed by: INTERNAL MEDICINE

## 2024-11-06 PROCEDURE — G0008 ADMIN INFLUENZA VIRUS VAC: HCPCS | Mod: ,,, | Performed by: INTERNAL MEDICINE

## 2024-11-06 NOTE — PROGRESS NOTES
Pt came into the office today for Influenza Vaccine  Pt tolerated imm well   Was given in right deltoid

## 2024-11-20 ENCOUNTER — OFFICE VISIT (OUTPATIENT)
Facility: CLINIC | Age: 74
End: 2024-11-20
Payer: MEDICARE

## 2024-11-20 VITALS
RESPIRATION RATE: 20 BRPM | SYSTOLIC BLOOD PRESSURE: 129 MMHG | BODY MASS INDEX: 39.27 KG/M2 | HEART RATE: 73 BPM | OXYGEN SATURATION: 99 % | HEIGHT: 64 IN | WEIGHT: 230 LBS | DIASTOLIC BLOOD PRESSURE: 68 MMHG

## 2024-11-20 DIAGNOSIS — M54.16 LUMBAR RADICULOPATHY: ICD-10-CM

## 2024-11-20 DIAGNOSIS — M54.16 LUMBAR RADICULOPATHY, CHRONIC: ICD-10-CM

## 2024-11-20 DIAGNOSIS — M51.362 DEGENERATION OF INTERVERTEBRAL DISC OF LUMBAR REGION WITH DISCOGENIC BACK PAIN AND LOWER EXTREMITY PAIN: ICD-10-CM

## 2024-11-20 DIAGNOSIS — M54.9 CHRONIC BACK PAIN GREATER THAN 3 MONTHS DURATION: Primary | ICD-10-CM

## 2024-11-20 DIAGNOSIS — G89.29 CHRONIC BACK PAIN GREATER THAN 3 MONTHS DURATION: Primary | ICD-10-CM

## 2024-11-20 PROCEDURE — 1159F MED LIST DOCD IN RCRD: CPT | Mod: CPTII,,, | Performed by: ANESTHESIOLOGY

## 2024-11-20 PROCEDURE — 3078F DIAST BP <80 MM HG: CPT | Mod: CPTII,,, | Performed by: ANESTHESIOLOGY

## 2024-11-20 PROCEDURE — 99213 OFFICE O/P EST LOW 20 MIN: CPT | Mod: ,,, | Performed by: ANESTHESIOLOGY

## 2024-11-20 PROCEDURE — 3288F FALL RISK ASSESSMENT DOCD: CPT | Mod: CPTII,,, | Performed by: ANESTHESIOLOGY

## 2024-11-20 PROCEDURE — 1101F PT FALLS ASSESS-DOCD LE1/YR: CPT | Mod: CPTII,,, | Performed by: ANESTHESIOLOGY

## 2024-11-20 PROCEDURE — 1125F AMNT PAIN NOTED PAIN PRSNT: CPT | Mod: CPTII,,, | Performed by: ANESTHESIOLOGY

## 2024-11-20 PROCEDURE — 1160F RVW MEDS BY RX/DR IN RCRD: CPT | Mod: CPTII,,, | Performed by: ANESTHESIOLOGY

## 2024-11-20 PROCEDURE — 4010F ACE/ARB THERAPY RXD/TAKEN: CPT | Mod: CPTII,,, | Performed by: ANESTHESIOLOGY

## 2024-11-20 PROCEDURE — 3074F SYST BP LT 130 MM HG: CPT | Mod: CPTII,,, | Performed by: ANESTHESIOLOGY

## 2024-11-20 NOTE — PROGRESS NOTES
Kaylie Mas MD        PATIENT NAME: Ngozi Webb  : 1950  DATE: 24  MRN: [id number]      Billing Provider: Kaylie Mas MD  Level of Service:   Patient PCP Information       Provider PCP Type    Antwon Knott MD General            Reason for Visit / Chief Complaint: Pain (Pt c/o leg pain. /Rating a 6 )       Update PCP  Update Chief Complaint         History of Present Illness / Problem Focused Workflow     Ngozi Webb presents to the clinic with Pain (Pt c/o leg pain. /Rating a 6 )     This is a 74-year-old female who presents to clinic today for follow up of low back pain that radiates down the right lower extremity into her foot.  She has had this pain for many years.  She is s/p a lumbar laminectomy in  and s/p L4 kyphoplasty in  in [place].  In addition, the patient is s/p a L3-4 left interbody fusion (DePuy) and L3-4 PLIF (K2M) on 2022 by Dr. Lam in [place].  She states that she did well for a few months after the surgery, but then her pain started to get worse again.  Gabapentin makes her sleepy, so she only takes a low dose at bedtime.  She also takes Tylenol Arthritis at bedtime.  She has seen a pain management physician in the past and undergone right L5 transforaminal epidural steroid injections in 2022 and 2022, which she states never really helped.  She underwent a right S1 transforaminal epidural steroid injection a few months ago, which provided mild relief.  She cannot sit or stand for too long.  She does not have any pain at night when she is lying down; she sleeps well.  She states that she has good days and bad days.        Low-back Pain  Associated symptoms include leg pain.   Leg Pain     Pain        Review of Systems     Review of Systems   Musculoskeletal:  Positive for back pain and leg pain.   Psychiatric/Behavioral:  Positive for sleep disturbance.    All other systems reviewed and are negative.        Medical / Social / Family History     Past Medical History:   Diagnosis Date    DDD (degenerative disc disease), lumbar     Dorsalgia     GERD (gastroesophageal reflux disease)     Hyperlipidemia     Hypertension     Hypothyroidism, unspecified type     Lumbar radiculopathy     Morbid obesity     EMMANUEL (obstructive sleep apnea)     does not wear cpap, wears oral device    Osteoarthritis of both knees     Paroxysmal atrial fibrillation        Past Surgical History:   Procedure Laterality Date    BILATERAL MASTECTOMY  1991    per powercharts notes: 10/14/21     SECTION      CHOLECYSTECTOMY      COSMETIC SURGERY  1991    ESOPHAGOGASTRODUODENOSCOPY  10/04/2021    FRACTURE SURGERY  2017    L4 fracture    HYSTERECTOMY  1974    SPINE SURGERY  2022    L4    TRANSFORAMINAL EPIDURAL INJECTION OF STEROID Right 2024    Procedure: INJECTION, STEROID, EPIDURAL, TRANSFORAMINAL APPROACH  /// right S1;  Surgeon: Kaylie Mas MD;  Location: [facility];  Service: Pain Management;  Laterality: Right;  Right TFESI S1       Social History  Ms. Webb  reports that she has never smoked. She has never used smokeless tobacco. She reports that she does not drink alcohol and does not use drugs.    Family History  Ms.'s Webb family history includes Alcohol abuse in her father; COPD in her sister; Depression in her daughter and daughter; Diabetes in her brother and sister; Early death in her father; Hypertension in her father; Stroke in her father and mother; Uterine cancer in her mother.    Medications and Allergies     Medications  Outpatient Medications Marked as Taking for the 24 encounter (Office Visit) with Kaylie Mas MD   Medication Sig Dispense Refill    acetaminophen (TYLENOL) 325 MG tablet Take 325 mg by mouth every 6 (six) hours as needed for Pain.      apixaban (ELIQUIS) 5 mg Tab Take 1 tablet (5 mg total) by mouth 2 (two) times daily. 180 tablet 3    calcium carbonate (CALCIUM 600 ORAL)  "Take 1,200 mg by mouth Daily.      cholecalciferol, vitamin D3, (VITAMIN D3) 50 mcg (2,000 unit) Cap capsule Take 5,000 Units by mouth Daily.      diltiaZEM (CARDIZEM CD) 120 MG Cp24 Take 120 mg by mouth once daily.      diphenhydrAMINE (BENADRYL) 25 mg capsule Take 25 mg by mouth every 6 (six) hours as needed for Itching.      gabapentin (NEURONTIN) 100 MG capsule Take 1 capsule (100 mg total) by mouth 2 (two) times daily. (Patient taking differently: Take 100 mg by mouth 2 (two) times daily as needed (Pain).) 180 capsule 3    hydroCHLOROthiazide (HYDRODIURIL) 25 MG tablet Take 1 tablet (25 mg total) by mouth once daily. 30 tablet 11    levothyroxine (SYNTHROID) 112 MCG tablet Take 1 tablet (112 mcg total) by mouth once daily. 90 tablet 3    lisinopriL 10 MG tablet TAKE 1 TABLET DAILY 90 tablet 3    magnesium 30 mg Tab Take 30 mg by mouth Daily.      melatonin 3 mg Cap Take 6 mg by mouth nightly. (Patient taking differently: Take 8 mg by mouth nightly.)      multivitamin (THERAGRAN) per tablet Take 1 tablet by mouth once daily.      omega-3 fatty acids/fish oil (FISH OIL-OMEGA-3 FATTY ACIDS) 300-1,000 mg capsule Take by mouth once daily.      omeprazole (PRILOSEC) 20 MG capsule Take 20 mg by mouth once daily.      semaglutide, weight loss, (WEGOVY) 0.25 mg/0.5 mL PnIj Inject 0.25 mg into the skin once a week. 8 mL 3    TURMERIC ORAL Take 1 capsule by mouth Daily.      UNABLE TO FIND Take 1 capsule by mouth Daily. Lecithin         Allergies  Review of patient's allergies indicates:   Allergen Reactions    Meperidine Nausea Only    Midazolam Nausea And Vomiting     "Makes her sick"         Physical Examination     Vitals:    11/20/24 1113   BP: 129/68   Pulse: 73   Resp: 20     Pain Disability Index (PDI): 46       Spine Musculoskeletal Exam    Gait    Gait is normal.    Range of Motion    Thoracolumbar        Thoracolumbar range of motion additional comments: Restricted range of motion in the lumbar spine due to " pain.    Strength    Thoracolumbar    Thoracolumbar motor exam is normal.       Sensory    Thoracolumbar    Thoracolumbar sensation is normal.    General      Constitutional: appears stated age, well-developed and well-nourished    Scleral icterus: no    Labored breathing: no    Psychiatric: normal mood and affect and no acute distress    Neurological: alert and oriented x3    Skin: intact    Lymphadenopathy: none  CV: extremities warm, well-perfused  Resp: nonlabored breathing    Assessment and Plan (including Health Maintenance)      Problem List  Smart Sets  Document Outside HM   :    Plan:   Chronic back pain greater than 3 months duration    Degeneration of intervertebral disc of lumbar region with discogenic back pain and lower extremity pain    Lumbar radiculopathy    Lumbar radiculopathy, chronic  -     MRI Lumbar Spine Without Contrast; Future; Expected date: 11/20/2024       We will call her with results about her MRI and any possible injections that can be done if she is interested.    Problem List Items Addressed This Visit          Neuro    Degenerative disc disease, lumbar    Lumbar radiculopathy       Orthopedic    Chronic back pain greater than 3 months duration - Primary     Other Visit Diagnoses       Lumbar radiculopathy, chronic        Relevant Orders    MRI Lumbar Spine Without Contrast              Future Appointments   Date Time Provider Department Center   12/16/2024 11:00 AM Antwon Knott MD [facility] [address] [id number]        There are no Patient Instructions on file for this visit.  No follow-ups on file.     Signature:  Kaylie Mas MD      Date of encounter: 11/20/24

## 2024-12-02 ENCOUNTER — TELEPHONE (OUTPATIENT)
Dept: INTERNAL MEDICINE | Facility: CLINIC | Age: 74
End: 2024-12-02
Payer: MEDICARE

## 2024-12-02 NOTE — TELEPHONE ENCOUNTER
----- Message from Med Assistant Trevino sent at 12/2/2024  9:25 AM CST -----  Regarding: WING 12/16/24 @ 11:00 Dr. Cornejo  1. Are there any outstanding tasks in the patient's chart? Yes, fasting labs    2. Is there any documentation in the chart? No    3.Has patient been seen in an ER, Urgent care clinic, or been admitted since last visit?  If yes, When, where, and why    4. Has patient seen any other healthcare providers since last visit?  If yes, when, where, and why    5. Has patient had any bloodwork or XR done since last visit?    6. Is patient signed up for patient portal?    7. Does patient have home blood pressure cuff?   If yes, please have patient bring to appointment for validation.

## 2024-12-04 ENCOUNTER — APPOINTMENT (OUTPATIENT)
Dept: RADIOLOGY | Facility: HOSPITAL | Age: 74
End: 2024-12-04
Attending: ANESTHESIOLOGY
Payer: MEDICARE

## 2024-12-04 DIAGNOSIS — M54.16 LUMBAR RADICULOPATHY, CHRONIC: ICD-10-CM

## 2024-12-04 PROCEDURE — 72148 MRI LUMBAR SPINE W/O DYE: CPT | Mod: TC

## 2024-12-11 ENCOUNTER — TELEPHONE (OUTPATIENT)
Dept: INTERNAL MEDICINE | Facility: CLINIC | Age: 74
End: 2024-12-11
Payer: MEDICARE

## 2024-12-11 ENCOUNTER — LAB VISIT (OUTPATIENT)
Dept: LAB | Facility: HOSPITAL | Age: 74
End: 2024-12-11
Attending: INTERNAL MEDICINE
Payer: MEDICARE

## 2024-12-11 DIAGNOSIS — E03.9 HYPOTHYROIDISM, UNSPECIFIED TYPE: Primary | ICD-10-CM

## 2024-12-11 DIAGNOSIS — I25.10 ASCVD (ARTERIOSCLEROTIC CARDIOVASCULAR DISEASE): ICD-10-CM

## 2024-12-11 DIAGNOSIS — E03.9 HYPOTHYROIDISM, UNSPECIFIED TYPE: ICD-10-CM

## 2024-12-11 LAB
ALBUMIN SERPL-MCNC: 4 G/DL (ref 3.4–4.8)
ALBUMIN/GLOB SERPL: 1.3 RATIO (ref 1.1–2)
ALP SERPL-CCNC: 74 UNIT/L (ref 40–150)
ALT SERPL-CCNC: 9 UNIT/L (ref 0–55)
ANION GAP SERPL CALC-SCNC: 6 MEQ/L
AST SERPL-CCNC: 18 UNIT/L (ref 5–34)
BILIRUB SERPL-MCNC: 0.4 MG/DL
BUN SERPL-MCNC: 30.7 MG/DL (ref 9.8–20.1)
CALCIUM SERPL-MCNC: 9.4 MG/DL (ref 8.4–10.2)
CHLORIDE SERPL-SCNC: 105 MMOL/L (ref 98–107)
CHOLEST SERPL-MCNC: 215 MG/DL
CHOLEST/HDLC SERPL: 3 {RATIO} (ref 0–5)
CO2 SERPL-SCNC: 30 MMOL/L (ref 23–31)
CREAT SERPL-MCNC: 0.99 MG/DL (ref 0.55–1.02)
CREAT/UREA NIT SERPL: 31
GFR SERPLBLD CREATININE-BSD FMLA CKD-EPI: 60 ML/MIN/1.73/M2
GLOBULIN SER-MCNC: 3.2 GM/DL (ref 2.4–3.5)
GLUCOSE SERPL-MCNC: 96 MG/DL (ref 82–115)
HDLC SERPL-MCNC: 66 MG/DL (ref 35–60)
LDLC SERPL CALC-MCNC: 132 MG/DL (ref 50–140)
POTASSIUM SERPL-SCNC: 4.6 MMOL/L (ref 3.5–5.1)
PROT SERPL-MCNC: 7.2 GM/DL (ref 5.8–7.6)
SODIUM SERPL-SCNC: 141 MMOL/L (ref 136–145)
T4 FREE SERPL-MCNC: 1.12 NG/DL (ref 0.7–1.48)
TRIGL SERPL-MCNC: 85 MG/DL (ref 37–140)
TSH SERPL-ACNC: 3.15 UIU/ML (ref 0.35–4.94)
VLDLC SERPL CALC-MCNC: 17 MG/DL

## 2024-12-11 PROCEDURE — 84443 ASSAY THYROID STIM HORMONE: CPT

## 2024-12-11 PROCEDURE — 36415 COLL VENOUS BLD VENIPUNCTURE: CPT

## 2024-12-11 PROCEDURE — 84439 ASSAY OF FREE THYROXINE: CPT

## 2024-12-11 PROCEDURE — 80053 COMPREHEN METABOLIC PANEL: CPT

## 2024-12-11 PROCEDURE — 80061 LIPID PANEL: CPT

## 2024-12-11 NOTE — TELEPHONE ENCOUNTER
Pt informed labs added             Janina Garduno Staff  Caller: Unspecified (Today,  2:17 PM)  Who Called: Ngozi Webb    Caller is requesting assistance/information from provider's office.    Symptoms (please be specific):    How long has patient had these symptoms:      List of preferred pharmacies on file (remove unneeded): [unfilled]  If different, enter pharmacy into here including location and phone number:        Preferred Method of Contact: Phone Call  Patient's Preferred Phone Number on File: [phone]  Best Call Back Number, if different:  Additional Information: pt went in  to draw blood today & want to know if she can use the same blood for her other lab order if so call [facility] and give the ok

## 2024-12-11 NOTE — TELEPHONE ENCOUNTER
----- Message from [employer]ulls sent at 12/11/2024 10:03 AM CST -----  .Type:  Patient Returning Call    Who Called:pt  Who Left Message for Patient:pt  Does the patient know what this is regarding?:thyroid lab  Would the patient rather a call back or a response via [facility]?   Best Call Back Number:[phone]  Additional Information: Patient just did labs please add a thyroid lab to this order. Lab will use her labs from today to test

## 2024-12-16 ENCOUNTER — OFFICE VISIT (OUTPATIENT)
Dept: INTERNAL MEDICINE | Facility: CLINIC | Age: 74
End: 2024-12-16
Payer: MEDICARE

## 2024-12-16 VITALS
SYSTOLIC BLOOD PRESSURE: 120 MMHG | DIASTOLIC BLOOD PRESSURE: 72 MMHG | WEIGHT: 238 LBS | RESPIRATION RATE: 16 BRPM | HEART RATE: 80 BPM | TEMPERATURE: 97 F | OXYGEN SATURATION: 99 % | HEIGHT: 64 IN | BODY MASS INDEX: 40.63 KG/M2

## 2024-12-16 DIAGNOSIS — M51.362 DEGENERATION OF INTERVERTEBRAL DISC OF LUMBAR REGION WITH DISCOGENIC BACK PAIN AND LOWER EXTREMITY PAIN: ICD-10-CM

## 2024-12-16 DIAGNOSIS — R79.9 ELEVATED BUN: ICD-10-CM

## 2024-12-16 DIAGNOSIS — E66.01 CLASS 2 SEVERE OBESITY DUE TO EXCESS CALORIES WITH SERIOUS COMORBIDITY AND BODY MASS INDEX (BMI) OF 39.0 TO 39.9 IN ADULT: Primary | ICD-10-CM

## 2024-12-16 DIAGNOSIS — T46.6X5A MYALGIA DUE TO STATIN: ICD-10-CM

## 2024-12-16 DIAGNOSIS — M79.10 MYALGIA DUE TO STATIN: ICD-10-CM

## 2024-12-16 DIAGNOSIS — E66.812 CLASS 2 SEVERE OBESITY DUE TO EXCESS CALORIES WITH SERIOUS COMORBIDITY AND BODY MASS INDEX (BMI) OF 39.0 TO 39.9 IN ADULT: Primary | ICD-10-CM

## 2024-12-16 DIAGNOSIS — I25.10 ASCVD (ARTERIOSCLEROTIC CARDIOVASCULAR DISEASE): ICD-10-CM

## 2024-12-16 PROCEDURE — 1126F AMNT PAIN NOTED NONE PRSNT: CPT | Mod: CPTII,,, | Performed by: INTERNAL MEDICINE

## 2024-12-16 PROCEDURE — 1101F PT FALLS ASSESS-DOCD LE1/YR: CPT | Mod: CPTII,,, | Performed by: INTERNAL MEDICINE

## 2024-12-16 PROCEDURE — 3078F DIAST BP <80 MM HG: CPT | Mod: CPTII,,, | Performed by: INTERNAL MEDICINE

## 2024-12-16 PROCEDURE — 3288F FALL RISK ASSESSMENT DOCD: CPT | Mod: CPTII,,, | Performed by: INTERNAL MEDICINE

## 2024-12-16 PROCEDURE — 3074F SYST BP LT 130 MM HG: CPT | Mod: CPTII,,, | Performed by: INTERNAL MEDICINE

## 2024-12-16 PROCEDURE — 99214 OFFICE O/P EST MOD 30 MIN: CPT | Mod: ,,, | Performed by: INTERNAL MEDICINE

## 2024-12-16 PROCEDURE — 1160F RVW MEDS BY RX/DR IN RCRD: CPT | Mod: CPTII,,, | Performed by: INTERNAL MEDICINE

## 2024-12-16 PROCEDURE — 1159F MED LIST DOCD IN RCRD: CPT | Mod: CPTII,,, | Performed by: INTERNAL MEDICINE

## 2024-12-16 PROCEDURE — 4010F ACE/ARB THERAPY RXD/TAKEN: CPT | Mod: CPTII,,, | Performed by: INTERNAL MEDICINE

## 2024-12-16 RX ORDER — SEMAGLUTIDE 0.25 MG/.5ML
0.25 INJECTION, SOLUTION SUBCUTANEOUS WEEKLY
Start: 2024-12-16

## 2024-12-16 NOTE — ASSESSMENT & PLAN NOTE
Body mass index is 40.85 kg/m². Morbid obesity complicates all aspects of disease management from diagnostic modalities to treatment. Weight loss encouraged and health benefits explained to patient.     Start G LP 1

## 2024-12-16 NOTE — ASSESSMENT & PLAN NOTE
Patient has her Wegovy prescription but never started will give us some more instructions today.  Patient advised to decrease intake of protein increase intake of fresh fruits vegetables whole grains and fish.

## 2024-12-16 NOTE — PROGRESS NOTES
Internal Medicine    Patient ID: [id number]     Chief Complaint: Weight Loss (3 month f/u) and ASCVD (3 month f/u)      HPI:     Ngozi Webb is a 74 y.o. female here today for a follow up.   Follow up  Lots of lumbar back pain she had an injection with improvement on the right side but not on the left he just had a MRI awaiting for her results.  She has gained some weight since her last visit approximately 8 lb.  Recently eating too much protein  Not eating fiber   Labs reviewed with patient amenable to starting, will put her on 0.25 mg of Wegovy for the next 4 weeks and then increase to 0.5 mg.  No personal or family history of medullary thyroid cancer or MEN type 2        Past Medical History:   Diagnosis Date    DDD (degenerative disc disease), lumbar     Dorsalgia     GERD (gastroesophageal reflux disease)     Hyperlipidemia     Hypertension     Hypothyroidism, unspecified type     Lumbar radiculopathy     Morbid obesity     EMMANUEL (obstructive sleep apnea)     does not wear cpap, wears oral device    Osteoarthritis of both knees     Paroxysmal atrial fibrillation         Past Surgical History:   Procedure Laterality Date    BILATERAL MASTECTOMY  1991    per powercharts notes: 10/14/21     SECTION      CHOLECYSTECTOMY      COSMETIC SURGERY  1991    ESOPHAGOGASTRODUODENOSCOPY  10/04/2021    FRACTURE SURGERY  2017    L4 fracture    HYSTERECTOMY  1974    SPINE SURGERY  2022    L4    TRANSFORAMINAL EPIDURAL INJECTION OF STEROID Right 2024    Procedure: INJECTION, STEROID, EPIDURAL, TRANSFORAMINAL APPROACH  /// right S1;  Surgeon: Kaylie Mas MD;  Location: [facility];  Service: Pain Management;  Laterality: Right;  Right TFESI S1        Social History     Tobacco Use    Smoking status: Never    Smokeless tobacco: Never   Substance and Sexual Activity    Alcohol use: Never    Drug use: Never    Sexual activity: Yes     Partners: Male     Birth control/protection: Post-menopausal, None  "       Current Outpatient Medications   Medication Instructions    acetaminophen (TYLENOL) 325 mg, Every 6 hours PRN    apixaban (ELIQUIS) 5 mg, Oral, 2 times daily    calcium carbonate (CALCIUM 600 ORAL) 1,200 mg, Daily    cholecalciferol (vitamin D3) (VITAMIN D3) 5,000 Units, Daily    diltiaZEM (CARDIZEM CD) 120 mg, Daily    diphenhydrAMINE (BENADRYL) 25 mg, Every 6 hours PRN    gabapentin (NEURONTIN) 100 mg, Oral, 2 times daily    hydroCHLOROthiazide (HYDRODIURIL) 25 mg, Oral, Daily    levothyroxine (SYNTHROID) 112 mcg, Oral, Daily    lisinopriL 10 MG tablet TAKE 1 TABLET DAILY    magnesium 30 mg, Daily    melatonin 6 mg, Oral, Nightly    multivitamin (THERAGRAN) per tablet 1 tablet, Daily    omega-3 fatty acids/fish oil (FISH OIL-OMEGA-3 FATTY ACIDS) 300-1,000 mg capsule Daily    omeprazole (PRILOSEC) 20 mg, Daily    TURMERIC ORAL 1 capsule, Daily    UNABLE TO FIND 1 capsule, Daily    WEGOVY 0.25 mg, Subcutaneous, Weekly, X 4 weeks then increase to 0.5mg       Review of patient's allergies indicates:   Allergen Reactions    Meperidine Nausea Only    Midazolam Nausea And Vomiting     "Makes her sick"          Patient Care Team:  Antwon Knott MD as PCP - General (Internal Medicine)  Ant Jean MD as Consulting Physician (Gastroenterology)     Subjective:     Review of Systems    12 point review of systems conducted, negative except as stated in the history of present illness. See HPI for details.    Objective:     Visit Vitals  /72 (BP Location: Right arm, Patient Position: Sitting)   Pulse 80   Temp 97.3 °F (36.3 °C) (Temporal)   Resp 16   Ht 5' 4" (1.626 m)   Wt 108 kg (238 lb)   SpO2 99%   BMI 40.85 kg/m²       Physical Exam  Constitutional:       Appearance: Normal appearance. She is obese.   HENT:      Head: Normocephalic and atraumatic.   Eyes:      Extraocular Movements: Extraocular movements intact.      Pupils: Pupils are equal, round, and reactive to light.   Cardiovascular:    "   Rate and Rhythm: Normal rate and regular rhythm.   Pulmonary:      Effort: Pulmonary effort is normal.      Breath sounds: Normal breath sounds.   Skin:     General: Skin is warm and dry.   Neurological:      General: No focal deficit present.      Mental Status: She is alert.   Psychiatric:         Mood and Affect: Mood normal.         Labs Reviewed:     Chemistry:  Lab Results   Component Value Date     12/11/2024    K 4.6 12/11/2024    BUN 30.7 (H) 12/11/2024    CREATININE 0.99 12/11/2024    EGFRNORACEVR 60 12/11/2024    GLUCOSE 96 12/11/2024    CALCIUM 9.4 12/11/2024    ALKPHOS 74 12/11/2024    LABPROT 7.2 12/11/2024    ALBUMIN 4.0 12/11/2024    BILIDIR 0.2 10/11/2021    IBILI 0.20 10/11/2021    AST 18 12/11/2024    ALT 9 12/11/2024    WJQMSSEO41VS 67.2 04/07/2021    TSH 3.150 12/11/2024    CFWFMA2GCUF 1.12 12/11/2024        Lab Results   Component Value Date    HGBA1C 5.1 05/31/2023        Hematology:  Lab Results   Component Value Date    WBC 5.80 12/06/2023    HGB 11.9 (L) 12/06/2023    HCT 36.2 (L) 12/06/2023     12/06/2023       Lipid Panel:  Lab Results   Component Value Date    CHOL 215 (H) 12/11/2024    HDL 66 (H) 12/11/2024    .00 12/11/2024    TRIG 85 12/11/2024    TOTALCHOLEST 3 12/11/2024        Urine:  Lab Results   Component Value Date    APPEARANCEUA Clear 12/06/2023    SGUA 1.021 12/06/2023    PROTEINUA Negative 12/06/2023    KETONESUA Negative 12/06/2023    LEUKOCYTESUR 250 (A) 12/06/2023    RBCUA 0-5 12/06/2023    WBCUA 6-10 (A) 12/06/2023    BACTERIA Trace 12/06/2023    SQEPUA Trace 12/06/2023        Assessment:       ICD-10-CM ICD-9-CM   1. Class 2 severe obesity due to excess calories with serious comorbidity and body mass index (BMI) of 39.0 to 39.9 in adult  E66.812 278.01    E66.01 V85.39    Z68.39    2. Degeneration of intervertebral disc of lumbar region with discogenic back pain and lower extremity pain  M51.362 722.52   3. Elevated BUN  R79.9 790.6   4. ASCVD  (arteriosclerotic cardiovascular disease)  I25.10 429.2     440.9        Plan:     1. Class 2 severe obesity due to excess calories with serious comorbidity and body mass index (BMI) of 39.0 to 39.9 in adult  Assessment & Plan:  Body mass index is 40.85 kg/m². Morbid obesity complicates all aspects of disease management from diagnostic modalities to treatment. Weight loss encouraged and health benefits explained to patient.     Start G LP 1          2. Degeneration of intervertebral disc of lumbar region with discogenic back pain and lower extremity pain  Assessment & Plan:  Ongoing issues with her back pain        3. Elevated BUN  -     Ambulatory referral/consult to Gastroenterology; Future; Expected date: 12/23/2024    4. ASCVD (arteriosclerotic cardiovascular disease)  Assessment & Plan:  Patient has her Wegovy prescription but never started will give us some more instructions today.  Patient advised to decrease intake of protein increase intake of fresh fruits vegetables whole grains and fish.    Orders:  -     semaglutide, weight loss, (WEGOVY) 0.25 mg/0.5 mL PnIj; Inject 0.25 mg into the skin once a week. X 4 weeks then increase to 0.5mg         Follow up in about 4 weeks (around 1/13/2025) for WEIGHT LOSS REVISIT, TELEMED. In addition to their scheduled follow up, the patient has also been instructed to follow up on as needed basis.     No future appointments.     Antwon Knott MD  An office visit for an established patient was performed. 10 minutes was used for reviewing the patients chart prior to the inoffice visit done on that same day. 15 minutes was used during the visit in regards to taking the patient history and physical exam. There was also an additional 5 minutes spent on education and counseling regarding medical conditions, current medications including risk/benefit and side effects/adverse events, vaccine counseling. After leaving the exam room, the provider then spent an additional 5  minutes completing the electronic health record.    The patient is receptive, expresses understanding and is agreeable to plan. All questions answered; total time spent was 35 minutes.

## 2024-12-18 ENCOUNTER — TELEPHONE (OUTPATIENT)
Facility: CLINIC | Age: 74
End: 2024-12-18
Payer: MEDICARE

## 2024-12-18 DIAGNOSIS — M54.9 CHRONIC BACK PAIN GREATER THAN 3 MONTHS DURATION: ICD-10-CM

## 2024-12-18 DIAGNOSIS — M79.604 RIGHT LEG PAIN: Primary | ICD-10-CM

## 2024-12-18 DIAGNOSIS — G89.29 CHRONIC BACK PAIN GREATER THAN 3 MONTHS DURATION: ICD-10-CM

## 2024-12-18 NOTE — TELEPHONE ENCOUNTER
----- Message from Med Assistant Mata sent at 12/18/2024 12:39 PM CST -----  Forwarded EMG from 1/2022 for you to review, pt is ok with doing another EMG she would like to have EMG done with Dr Hall.

## 2024-12-23 ENCOUNTER — TELEPHONE (OUTPATIENT)
Dept: NEUROLOGY | Facility: CLINIC | Age: 74
End: 2024-12-23
Payer: MEDICARE

## 2024-12-23 DIAGNOSIS — M54.16 LUMBAR RADICULOPATHY: Primary | ICD-10-CM

## 2024-12-23 DIAGNOSIS — M51.362 DEGENERATION OF INTERVERTEBRAL DISC OF LUMBAR REGION WITH DISCOGENIC BACK PAIN AND LOWER EXTREMITY PAIN: ICD-10-CM

## 2024-12-26 ENCOUNTER — TELEPHONE (OUTPATIENT)
Facility: CLINIC | Age: 74
End: 2024-12-26
Payer: MEDICARE

## 2024-12-26 NOTE — TELEPHONE ENCOUNTER
----- Message from Med Assistant Braxton sent at 12/20/2024 11:38 AM CST -----  Good morning! I'm with Dr. Diaz[facility] and I schedule all of the EMG's. I just wanted to bring to your attention that the next open slot we have for any EMG's wouldn't be until the end of march. I'm not sure if your provider would like to wait that long. Let me know when any new decision is made. Thanks in advance and have a blessed Malissa Soto!

## 2024-12-27 ENCOUNTER — TELEPHONE (OUTPATIENT)
Dept: INTERNAL MEDICINE | Facility: CLINIC | Age: 74
End: 2024-12-27
Payer: MEDICARE

## 2024-12-27 NOTE — TELEPHONE ENCOUNTER
----- Message from Rocky sent at 12/27/2024  8:44 AM CST -----  .Who Called: Ngozi Webb    Caller is requesting assistance/information from provider's office.    Symptoms (please be specific): n/a   How long has patient had these symptoms:  n/a  List of preferred pharmacies on file (remove unneeded): [unfilled]  If different, enter pharmacy into here including location and phone number: n/a      Preferred Method of Contact: Phone Call    Patient's Preferred Phone Number on File: [phone]     Best Call Back Number, if different:    Additional Information: Pt called stating she tried to schedule an appointment to see Dr. Ant Jean (GI) and was told they haven't received referral. Made pt aware referral was sent. Pt is requesting referral to be re-faxed to [phone]. Requesting a cb once faxed. Please advise, thank you.

## 2024-12-27 NOTE — TELEPHONE ENCOUNTER
Spoke to Dr. Jean's office and they have received the referral pt they are going to call her to schedule appt.

## 2025-01-06 NOTE — ASSESSMENT & PLAN NOTE
Continue current management with Wellbutrin will be adding trazodone at bedtime.  RTC 6 months   
No

## 2025-01-07 DIAGNOSIS — I10 PRIMARY HYPERTENSION: ICD-10-CM

## 2025-01-07 RX ORDER — HYDROCHLOROTHIAZIDE 25 MG/1
25 TABLET ORAL DAILY
Qty: 30 TABLET | Refills: 11 | Status: SHIPPED | OUTPATIENT
Start: 2025-01-07 | End: 2026-01-07

## 2025-03-13 DIAGNOSIS — E03.9 HYPOTHYROIDISM, UNSPECIFIED TYPE: ICD-10-CM

## 2025-03-14 RX ORDER — LEVOTHYROXINE SODIUM 112 UG/1
112 TABLET ORAL DAILY
Qty: 90 TABLET | Refills: 3 | Status: SHIPPED | OUTPATIENT
Start: 2025-03-14

## 2025-03-21 ENCOUNTER — PATIENT MESSAGE (OUTPATIENT)
Dept: INTERNAL MEDICINE | Facility: CLINIC | Age: 75
End: 2025-03-21
Payer: MEDICARE

## 2025-03-21 DIAGNOSIS — I10 PRIMARY HYPERTENSION: ICD-10-CM

## 2025-03-24 RX ORDER — HYDROCHLOROTHIAZIDE 25 MG/1
25 TABLET ORAL DAILY
Qty: 90 TABLET | Refills: 3 | Status: SHIPPED | OUTPATIENT
Start: 2025-03-24 | End: 2026-03-24

## 2025-05-07 ENCOUNTER — TELEPHONE (OUTPATIENT)
Dept: INTERNAL MEDICINE | Facility: CLINIC | Age: 75
End: 2025-05-07
Payer: MEDICARE

## 2025-05-07 DIAGNOSIS — E66.01 CLASS 2 SEVERE OBESITY DUE TO EXCESS CALORIES WITH SERIOUS COMORBIDITY AND BODY MASS INDEX (BMI) OF 39.0 TO 39.9 IN ADULT: ICD-10-CM

## 2025-05-07 DIAGNOSIS — E03.9 HYPOTHYROIDISM, UNSPECIFIED TYPE: ICD-10-CM

## 2025-05-07 DIAGNOSIS — E66.812 CLASS 2 SEVERE OBESITY DUE TO EXCESS CALORIES WITH SERIOUS COMORBIDITY AND BODY MASS INDEX (BMI) OF 39.0 TO 39.9 IN ADULT: ICD-10-CM

## 2025-05-07 DIAGNOSIS — Z78.0 POSTMENOPAUSAL STATE: ICD-10-CM

## 2025-05-07 DIAGNOSIS — E78.49 OTHER HYPERLIPIDEMIA: ICD-10-CM

## 2025-05-07 DIAGNOSIS — I25.10 ASCVD (ARTERIOSCLEROTIC CARDIOVASCULAR DISEASE): ICD-10-CM

## 2025-05-07 DIAGNOSIS — I10 PRIMARY HYPERTENSION: Primary | ICD-10-CM

## 2025-05-07 DIAGNOSIS — Z00.00 MEDICARE ANNUAL WELLNESS VISIT, SUBSEQUENT: ICD-10-CM

## 2025-05-07 NOTE — TELEPHONE ENCOUNTER
Copied from CRM #[id number]. Topic: Appointments - Appointment Scheduling  >> May 7, 2025 11:26 AM Vishnu Mosqueda wrote:  Who Called: Ngozi eWbb    Caller is requesting a sooner appointment. Caller declined first available appointment listed below. Caller will not accept being placed on the waitlist and is requesting a message be sent to doctor.    When is the first available appointment?Jul 15  Options offered (Virtual Visit, Urgent Care): N/A  Symptoms:N/A      Preferred Method of Contact: Phone Call  Patient's Preferred Phone Number on File: [phone]   Best Call Back Number, if different:  Additional Information: pt stated she needs a 6 month f/u June appt to check thyroids. Pt said it has to be in the morning. Pt stated she lives kind of far and doesn't want to be in [place] late. Pt stated she'd also like lab orders put in

## 2025-05-13 ENCOUNTER — LAB VISIT (OUTPATIENT)
Dept: LAB | Facility: HOSPITAL | Age: 75
End: 2025-05-13
Attending: INTERNAL MEDICINE
Payer: MEDICARE

## 2025-05-13 ENCOUNTER — RESULTS FOLLOW-UP (OUTPATIENT)
Dept: HEPATOLOGY | Facility: HOSPITAL | Age: 75
End: 2025-05-13
Payer: MEDICARE

## 2025-05-13 DIAGNOSIS — D50.9 IRON DEFICIENCY ANEMIA, UNSPECIFIED IRON DEFICIENCY ANEMIA TYPE: ICD-10-CM

## 2025-05-13 DIAGNOSIS — E78.49 OTHER HYPERLIPIDEMIA: ICD-10-CM

## 2025-05-13 DIAGNOSIS — I10 PRIMARY HYPERTENSION: ICD-10-CM

## 2025-05-13 DIAGNOSIS — Z00.00 MEDICARE ANNUAL WELLNESS VISIT, SUBSEQUENT: ICD-10-CM

## 2025-05-13 DIAGNOSIS — E66.01 CLASS 2 SEVERE OBESITY DUE TO EXCESS CALORIES WITH SERIOUS COMORBIDITY AND BODY MASS INDEX (BMI) OF 39.0 TO 39.9 IN ADULT: ICD-10-CM

## 2025-05-13 DIAGNOSIS — D50.9 IRON DEFICIENCY ANEMIA, UNSPECIFIED IRON DEFICIENCY ANEMIA TYPE: Primary | ICD-10-CM

## 2025-05-13 DIAGNOSIS — Z78.0 POSTMENOPAUSAL STATE: ICD-10-CM

## 2025-05-13 DIAGNOSIS — E66.812 CLASS 2 SEVERE OBESITY DUE TO EXCESS CALORIES WITH SERIOUS COMORBIDITY AND BODY MASS INDEX (BMI) OF 39.0 TO 39.9 IN ADULT: ICD-10-CM

## 2025-05-13 DIAGNOSIS — E03.9 HYPOTHYROIDISM, UNSPECIFIED TYPE: ICD-10-CM

## 2025-05-13 DIAGNOSIS — I25.10 ASCVD (ARTERIOSCLEROTIC CARDIOVASCULAR DISEASE): ICD-10-CM

## 2025-05-13 LAB
25(OH)D3+25(OH)D2 SERPL-MCNC: 88 NG/ML (ref 30–80)
ALBUMIN SERPL-MCNC: 3.9 G/DL (ref 3.4–4.8)
ALBUMIN/GLOB SERPL: 1.1 RATIO (ref 1.1–2)
ALP SERPL-CCNC: 80 UNIT/L (ref 40–150)
ALT SERPL-CCNC: 7 UNIT/L (ref 0–55)
ANION GAP SERPL CALC-SCNC: 7 MEQ/L
AST SERPL-CCNC: 14 UNIT/L (ref 11–45)
BACTERIA #/AREA URNS AUTO: NORMAL /HPF
BASOPHILS # BLD AUTO: 0.05 X10(3)/MCL
BASOPHILS NFR BLD AUTO: 0.7 %
BILIRUB SERPL-MCNC: 0.4 MG/DL
BILIRUB UR QL STRIP.AUTO: NEGATIVE
BUN SERPL-MCNC: 23.1 MG/DL (ref 9.8–20.1)
CALCIUM SERPL-MCNC: 9.6 MG/DL (ref 8.4–10.2)
CHLORIDE SERPL-SCNC: 103 MMOL/L (ref 98–107)
CHOLEST SERPL-MCNC: 205 MG/DL
CHOLEST/HDLC SERPL: 3 {RATIO} (ref 0–5)
CLARITY UR: CLEAR
CO2 SERPL-SCNC: 27 MMOL/L (ref 23–31)
COLOR UR AUTO: NORMAL
CREAT SERPL-MCNC: 0.97 MG/DL (ref 0.55–1.02)
CREAT/UREA NIT SERPL: 24
EOSINOPHIL # BLD AUTO: 0.21 X10(3)/MCL (ref 0–0.9)
EOSINOPHIL NFR BLD AUTO: 3 %
ERYTHROCYTE [DISTWIDTH] IN BLOOD BY AUTOMATED COUNT: 12.4 % (ref 11.5–17)
FERRITIN SERPL-MCNC: 76.21 NG/ML (ref 4.63–204)
GFR SERPLBLD CREATININE-BSD FMLA CKD-EPI: >60 ML/MIN/1.73/M2
GLOBULIN SER-MCNC: 3.6 GM/DL (ref 2.4–3.5)
GLUCOSE SERPL-MCNC: 95 MG/DL (ref 82–115)
GLUCOSE UR QL STRIP: NORMAL
HCT VFR BLD AUTO: 35.2 % (ref 37–47)
HDLC SERPL-MCNC: 67 MG/DL (ref 35–60)
HGB BLD-MCNC: 11.4 G/DL (ref 12–16)
HGB UR QL STRIP: NEGATIVE
IMM GRANULOCYTES # BLD AUTO: 0.05 X10(3)/MCL (ref 0–0.04)
IMM GRANULOCYTES NFR BLD AUTO: 0.7 %
IRON SATN MFR SERPL: 35 % (ref 20–50)
IRON SERPL-MCNC: 108 UG/DL (ref 50–170)
KETONES UR QL STRIP: NEGATIVE
LDLC SERPL CALC-MCNC: 126 MG/DL (ref 50–140)
LEUKOCYTE ESTERASE UR QL STRIP: NEGATIVE
LYMPHOCYTES # BLD AUTO: 1.96 X10(3)/MCL (ref 0.6–4.6)
LYMPHOCYTES NFR BLD AUTO: 27.7 %
MCH RBC QN AUTO: 30.7 PG (ref 27–31)
MCHC RBC AUTO-ENTMCNC: 32.4 G/DL (ref 33–36)
MCV RBC AUTO: 94.9 FL (ref 80–94)
MONOCYTES # BLD AUTO: 0.6 X10(3)/MCL (ref 0.1–1.3)
MONOCYTES NFR BLD AUTO: 8.5 %
NEUTROPHILS # BLD AUTO: 4.21 X10(3)/MCL (ref 2.1–9.2)
NEUTROPHILS NFR BLD AUTO: 59.4 %
NITRITE UR QL STRIP: NEGATIVE
NRBC BLD AUTO-RTO: 0 %
PH UR STRIP: 7 [PH]
PLATELET # BLD AUTO: 273 X10(3)/MCL (ref 130–400)
PMV BLD AUTO: 8.9 FL (ref 7.4–10.4)
POTASSIUM SERPL-SCNC: 4.3 MMOL/L (ref 3.5–5.1)
PROT SERPL-MCNC: 7.5 GM/DL (ref 5.8–7.6)
PROT UR QL STRIP: NEGATIVE
RBC # BLD AUTO: 3.71 X10(6)/MCL (ref 4.2–5.4)
RBC #/AREA URNS AUTO: NORMAL /HPF
SODIUM SERPL-SCNC: 137 MMOL/L (ref 136–145)
SP GR UR STRIP.AUTO: 1.01 (ref 1–1.03)
SQUAMOUS #/AREA URNS LPF: NORMAL /HPF
T3FREE SERPL-MCNC: 2.26 PG/ML (ref 1.58–3.91)
T4 FREE SERPL-MCNC: 1.09 NG/DL (ref 0.7–1.48)
TIBC SERPL-MCNC: 203 UG/DL (ref 70–310)
TIBC SERPL-MCNC: 311 UG/DL (ref 250–450)
TRANSFERRIN SERPL-MCNC: 278 MG/DL (ref 173–360)
TRIGL SERPL-MCNC: 58 MG/DL (ref 37–140)
TSH SERPL-ACNC: 2.12 UIU/ML (ref 0.35–4.94)
UROBILINOGEN UR STRIP-ACNC: NORMAL
VLDLC SERPL CALC-MCNC: 12 MG/DL
WBC # BLD AUTO: 7.08 X10(3)/MCL (ref 4.5–11.5)
WBC #/AREA URNS AUTO: NORMAL /HPF

## 2025-05-13 PROCEDURE — 84481 FREE ASSAY (FT-3): CPT

## 2025-05-13 PROCEDURE — 80061 LIPID PANEL: CPT

## 2025-05-13 PROCEDURE — 84443 ASSAY THYROID STIM HORMONE: CPT

## 2025-05-13 PROCEDURE — 36415 COLL VENOUS BLD VENIPUNCTURE: CPT

## 2025-05-13 PROCEDURE — 84439 ASSAY OF FREE THYROXINE: CPT

## 2025-05-13 PROCEDURE — 82728 ASSAY OF FERRITIN: CPT

## 2025-05-13 PROCEDURE — 80053 COMPREHEN METABOLIC PANEL: CPT

## 2025-05-13 PROCEDURE — 82306 VITAMIN D 25 HYDROXY: CPT

## 2025-05-13 PROCEDURE — 83550 IRON BINDING TEST: CPT

## 2025-05-13 PROCEDURE — 81015 MICROSCOPIC EXAM OF URINE: CPT

## 2025-05-13 PROCEDURE — 85025 COMPLETE CBC W/AUTO DIFF WBC: CPT

## 2025-05-15 ENCOUNTER — DOCUMENTATION ONLY (OUTPATIENT)
Dept: INTERNAL MEDICINE | Facility: CLINIC | Age: 75
End: 2025-05-15

## 2025-05-15 ENCOUNTER — OFFICE VISIT (OUTPATIENT)
Dept: INTERNAL MEDICINE | Facility: CLINIC | Age: 75
End: 2025-05-15
Payer: MEDICARE

## 2025-05-15 VITALS
TEMPERATURE: 98 F | OXYGEN SATURATION: 98 % | BODY MASS INDEX: 40.29 KG/M2 | HEIGHT: 64 IN | WEIGHT: 236 LBS | DIASTOLIC BLOOD PRESSURE: 82 MMHG | HEART RATE: 73 BPM | SYSTOLIC BLOOD PRESSURE: 132 MMHG

## 2025-05-15 DIAGNOSIS — I10 PRIMARY HYPERTENSION: ICD-10-CM

## 2025-05-15 DIAGNOSIS — Z00.00 WELL ADULT EXAM: Primary | ICD-10-CM

## 2025-05-15 DIAGNOSIS — K21.9 GASTROESOPHAGEAL REFLUX DISEASE, UNSPECIFIED WHETHER ESOPHAGITIS PRESENT: ICD-10-CM

## 2025-05-15 DIAGNOSIS — Z85.3 HISTORY OF BREAST CANCER: ICD-10-CM

## 2025-05-15 DIAGNOSIS — R06.09 DOE (DYSPNEA ON EXERTION): ICD-10-CM

## 2025-05-15 DIAGNOSIS — I25.10 ASCVD (ARTERIOSCLEROTIC CARDIOVASCULAR DISEASE): ICD-10-CM

## 2025-05-15 DIAGNOSIS — I48.0 PAROXYSMAL ATRIAL FIBRILLATION: ICD-10-CM

## 2025-05-15 DIAGNOSIS — E03.9 HYPOTHYROIDISM, UNSPECIFIED TYPE: ICD-10-CM

## 2025-05-15 DIAGNOSIS — E78.49 OTHER HYPERLIPIDEMIA: ICD-10-CM

## 2025-05-15 NOTE — PROGRESS NOTES
Internal Medicine      Patient ID: [id number]     Chief Complaint: Medicare Annual Wellness     HPI:     Ngozi Webb is a 75 y.o. female here today for a Medicare Annual Wellness visit and comprehensive Health Risk Assessment.  Reviewed and discussed lab results. Mild anemia noted - normal iron/ferritin.     Feels HCTZ may be drying her out a little too much and precipitating Afib.  Reports an episode of jaw tightness/pain and CP that lasted about 2-3 hours. Also reports BELL. Rate was ranging from 140-180s and irregular. Has not seen cardiology in the past several months. Reports last stress test/ECHO about 2-3 years ago.     The 10-year ASCVD risk score (Crystal PITT, et al., 2019) is: 24.8%    Values used to calculate the score:      Age: 75 years      Sex: Female      Is Non- : No      Diabetic: No      Tobacco smoker: No      Systolic Blood Pressure: 132 mmHg      Is BP treated: Yes      HDL Cholesterol: 67 mg/dL      Total Cholesterol: 205 mg/dL     A separate E/M code has been provided to evaluate additional complaints that the patient would like addressed during the dedicated Medicare Wellness Exam.    Health Maintenance         Date Due Completion Date    Hepatitis C Screening Never done ---    TETANUS VACCINE Never done ---    RSV Vaccine (Age 60+ and Pregnant patients) (1 - 1-dose 75+ series) Never done ---    COVID-19 Vaccine (3 - 2024-25 season) 09/01/2024 2/12/2021    DEXA Scan 06/06/2027 6/6/2024    Lipid Panel 05/13/2030 5/13/2025             Past Medical History:   Diagnosis Date    DDD (degenerative disc disease), lumbar     Dorsalgia     GERD (gastroesophageal reflux disease)     History of breast cancer 05/15/2025    Hyperlipidemia     Hypertension     Hypothyroidism, unspecified type     Lumbar radiculopathy     Morbid obesity     EMMANUEL (obstructive sleep apnea)     does not wear cpap, wears oral device    Osteoarthritis of both knees     Paroxysmal atrial fibrillation          Past Surgical History:   Procedure Laterality Date    BILATERAL MASTECTOMY  1991    per powercharts notes: 10/14/21     SECTION      CHOLECYSTECTOMY      COSMETIC SURGERY  1991    ESOPHAGOGASTRODUODENOSCOPY  10/04/2021    FRACTURE SURGERY  2017    L4 fracture    HYSTERECTOMY  1974    SPINE SURGERY  2022    L4    TRANSFORAMINAL EPIDURAL INJECTION OF STEROID Right 2024    Procedure: INJECTION, STEROID, EPIDURAL, TRANSFORAMINAL APPROACH  /// right S1;  Surgeon: Kaylie Mas MD;  Location: [facility] OR;  Service: Pain Management;  Laterality: Right;  Right TFESI S1        Social History     Socioeconomic History    Marital status:    Tobacco Use    Smoking status: Never    Smokeless tobacco: Never   Substance and Sexual Activity    Alcohol use: Never    Drug use: Never    Sexual activity: Yes     Partners: Male     Birth control/protection: Post-menopausal, None     Social Drivers of Health     Financial Resource Strain: Low Risk  (2025)    Overall Financial Resource Strain (CARDIA)     Difficulty of Paying Living Expenses: Not hard at all   Food Insecurity: No Food Insecurity (2025)    Hunger Vital Sign     Worried About Running Out of Food in the Last Year: Never true     Ran Out of Food in the Last Year: Never true   Transportation Needs: No Transportation Needs (2025)    PRAPARE - Transportation     Lack of Transportation (Medical): No     Lack of Transportation (Non-Medical): No   Physical Activity: Inactive (2025)    Exercise Vital Sign     Days of Exercise per Week: 0 days     Minutes of Exercise per Session: 0 min   Stress: No Stress Concern Present (2025)    [employer] - Occupational Stress Questionnaire     Feeling of Stress : Only a little   Housing Stability: Low Risk  (2025)    Housing Stability Vital Sign     Unable to Pay for Housing in the Last Year: No     Number of Times Moved in the Last Year: 0      "Homeless in the Last Year: No        Family History   Problem Relation Name Age of Onset    Uterine cancer Mother Michaela Gordon     Stroke Mother Michaela Gordon     Alcohol abuse Father Gurinder Gordon Jr.     Early death Father Gurindre Gordon Jr.          at age 44    Hypertension Father Gurinder Gordon Jr.     Stroke Father Gurinder Gordon Jr.     COPD Sister Mary Ellen Noyola     Diabetes Sister Mary Ellen Noyola     Depression Daughter Zaida Burroughs     Depression Daughter Judson Louis     Diabetes Brother Gurinder Gordon III         Current Outpatient Medications   Medication Instructions    acetaminophen (TYLENOL) 325 mg, Every 6 hours PRN    apixaban (ELIQUIS) 5 mg, Oral, 2 times daily    calcium carbonate (CALCIUM 600 ORAL) 1,200 mg, Daily    cholecalciferol (vitamin D3) (VITAMIN D3) 5,000 Units, Daily    diltiaZEM (CARDIZEM CD) 120 mg, Daily    diphenhydrAMINE (BENADRYL) 25 mg, Every 6 hours PRN    levothyroxine (SYNTHROID) 112 mcg, Oral, Daily    lisinopriL 10 MG tablet TAKE 1 TABLET DAILY    magnesium 30 mg, Daily    melatonin 6 mg, Oral, Nightly    multivitamin (THERAGRAN) per tablet 1 tablet, Daily    omega-3 fatty acids/fish oil (FISH OIL-OMEGA-3 FATTY ACIDS) 300-1,000 mg capsule Daily    omeprazole (PRILOSEC) 20 mg, Daily    TURMERIC ORAL 1 capsule, Daily    UNABLE TO FIND 1 capsule, Daily       Review of patient's allergies indicates:   Allergen Reactions    Meperidine Nausea Only    Midazolam Nausea And Vomiting     "Makes her sick"          Immunization History   Administered Date(s) Administered    COVID-19, MRNA, LN-S, PF (Pfizer) (Purple Cap) 2021, 2021    Influenza (FLUAD) - Quadrivalent - Adjuvanted - PF *Preferred* (65+) 10/25/2022, 2023    Influenza - Trivalent - Fluad - Adjuvanted - PF (65 years and older 2024    Influenza Whole 2009    Pneumococcal Conjugate - 13 Valent 2020    Pneumococcal Conjugate - 20 Valent 2022    " "Pneumococcal Conjugate - 7 Valent 09/02/2009    Zoster Recombinant 02/14/2022, 04/27/2022        Patient Care Team:  Antwon Knott MD as PCP - General (Internal Medicine)  Ant Jean MD as Consulting Physician (Gastroenterology)    Subjective:     Review of Systems    12 point review of systems conducted, negative except as stated in the history of present illness. See HPI for details.    Objective:     Visit Vitals  /82   Pulse 73   Temp 98 °F (36.7 °C) (Temporal)   Ht 5' 4" (1.626 m)   Wt 107 kg (236 lb)   SpO2 98%   BMI 40.51 kg/m²        Physical Exam  Vitals and nursing note reviewed.   Constitutional:       General: She is not in acute distress.     Appearance: She is obese. She is not ill-appearing.   HENT:      Head: Normocephalic and atraumatic.      Mouth/Throat:      Mouth: Mucous membranes are moist.      Pharynx: Oropharynx is clear.   Eyes:      General: No scleral icterus.     Extraocular Movements: Extraocular movements intact.      Conjunctiva/sclera: Conjunctivae normal.      Pupils: Pupils are equal, round, and reactive to light.   Neck:      Vascular: No carotid bruit.   Cardiovascular:      Rate and Rhythm: Normal rate and regular rhythm.      Heart sounds: No murmur heard.     No friction rub. No gallop.   Pulmonary:      Effort: Pulmonary effort is normal. No respiratory distress.      Breath sounds: Normal breath sounds. No wheezing, rhonchi or rales.   Abdominal:      General: Abdomen is flat. Bowel sounds are normal. There is no distension.      Palpations: Abdomen is soft. There is no mass.      Tenderness: There is no abdominal tenderness.   Musculoskeletal:         General: Normal range of motion.      Cervical back: Normal range of motion and neck supple.   Skin:     General: Skin is warm and dry.   Neurological:      General: No focal deficit present.      Mental Status: She is alert.   Psychiatric:         Mood and Affect: Mood normal.         Assessment: "       ICD-10-CM ICD-9-CM   1. Well adult exam  Z00.00 V70.0   2. Primary hypertension  I10 401.9   3. Paroxysmal atrial fibrillation  I48.0 427.31   4. Other hyperlipidemia  E78.49 272.4   5. ASCVD (arteriosclerotic cardiovascular disease)  I25.10 429.2     440.9   6. Hypothyroidism, unspecified type  E03.9 244.9   7. Gastroesophageal reflux disease, unspecified whether esophagitis present  K21.9 530.81   8. History of breast cancer  Z85.3 V10.3   9. BELL (dyspnea on exertion)  R06.09 786.09        Plan:     1. Well adult exam  Assessment & Plan:  Alcohol/Tobacco Use - Stressed importance of smoking cessation and limiting alcohol intake.  CVD Risk Factors - Reviewed  Obesity/Physical Activity - Normal BMI. Encouraged daily 30 minute physical activity x 5 days per week.    Cervical Cancer Screening - No longer indicated. S/p hysterectomy.  Breast Cancer Screening - s/p bilateral mastectomies.   Colon Cancer Screening - Colonoscopy on  Osteoporosis Screening - Last DEXA on 6/2024. Results show Osteopenia. Follow up in 1 year  Eye Exam - Recommend annually.  Dental Exam - Recommend biannually  Vaccinations -   Immunization History   Administered Date(s) Administered    COVID-19, MRNA, LN-S, PF (Pfizer) (Purple Cap) 01/22/2021, 02/12/2021    Influenza (FLUAD) - Quadrivalent - Adjuvanted - PF *Preferred* (65+) 10/25/2022, 11/20/2023    Influenza - Trivalent - Fluad - Adjuvanted - PF (65 years and older 11/06/2024    Influenza Whole 09/02/2009    Pneumococcal Conjugate - 13 Valent 02/24/2020    Pneumococcal Conjugate - 20 Valent 04/27/2022    Pneumococcal Conjugate - 7 Valent 09/02/2009    Zoster Recombinant 02/14/2022, 04/27/2022         2. Primary hypertension  Assessment & Plan:  Well controlled.   Hypertension Medications              diltiaZEM (CARDIZEM CD) 120 MG Cp24 Take 120 mg by mouth once daily.    hydroCHLOROthiazide (HYDRODIURIL) 25 MG tablet Take 1 tablet (25 mg total) by mouth once daily.    lisinopriL 10 MG  tablet TAKE 1 TABLET DAILY   Reduce HCTZ to 12.5mg daily  Low Sodium Diet (DASH Diet - Less than 2 grams of sodium per day).  Monitor blood pressure daily and log. Report consistent numbers greater than 140/90.  Maintain healthy weight with goal BMI <30. Exercise 30 minutes per day, 5 days per week.        3. Paroxysmal atrial fibrillation  Assessment & Plan:  [id number] Score: 4  Continue Eliquis 5mg BID + Cardizem 120mg ER daily  Needs workup with cardiology - has appt lined up for next month but will call for sooner appt  EKG toady - Sinus Rhythm        4. Other hyperlipidemia  Assessment & Plan:  Lab Results   Component Value Date    .00 05/13/2025    TRIG 58 05/13/2025    HDL 67 (H) 05/13/2025    TOTALCHOLEST 3 05/13/2025     Hyperlipidemia Medications              omega-3 fatty acids/fish oil (FISH OIL-OMEGA-3 FATTY ACIDS) 300-1,000 mg capsule Take by mouth once daily.     Stressed importance of dietary modifications. Follow a low cholesterol, low saturated fat diet with less that 200mg of cholesterol a day.  Avoid fried foods and high saturated fats (high saturated fats less than 7% of calories).  Add Flax Seed/Fish Oil supplements to diet. Increase dietary fiber.  Regular exercise can reduce LDL and raise HDL. Stressed importance of physical activity 5 times per week for 30 minutes per day.       5. ASCVD (arteriosclerotic cardiovascular disease)  Assessment & Plan:  Unable to afford Wegovy      6. Hypothyroidism, unspecified type  Assessment & Plan:  Lab Results   Component Value Date    TSH 2.116 05/13/2025    [id number] 1.09 05/13/2025      Continue Levothyroxine 112mcg daily  Take medicine on an empty stomach with water (no other medications or beverages). Wait 30 minutes to eat or drink.  Report any symptoms of thinning hair, breaking nails, fatigue, weight gain or loss, palpitations.         7. Gastroesophageal reflux disease, unspecified whether esophagitis present  Assessment &  Plan:  Continue Omeprazole 20mg daily  S/p EGD in  1/2025 + for [facility]   Avoid spicy, acidic, fried foods and alcohol.  Eat 2-3 hours before going to bed.  Avoid tight clothing, chew food thoroughly.  Reduce caffeine intake, avoid soda.      8. History of breast cancer  Overview:  Dr. 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 BELL (dyspnea on exertion)  Assessment & Plan:  Needs cardiac workup - will obtain sooner appt with cards  ED precautions discussed        A comprehensive HEALTH RISK ASSESSMENT was completed today. Results are summarized below:    There are NO EMOTIONAL/SOCIAL CONCERNS identified on today's screening for Social Isolation, Depression and Anxiety.    There are NO COGNITIVE FUNCTION CONCERNS identified on today's screening.  The following FUNCTIONAL AND/OR SAFETY CONCERNS were identified on today's screening for Physical Symptoms, Nutritional, Home Safety/Living Situation, Fall Risk, Activities of Daily Living, Independent Activities of Daily Living, Physical Activity,Timed Up and Go test and Whisper test::  *Patient reports NO ROUTINE EXERCISE. (On average, how many days per week do you engage in moderate to strenuous exercise (like a brisk walk)?: (!) 0)      The patient reports NO OPIOID PRESCRIPTIONS. This was confirmed through medication reconciliation.    The patient is NOT A TOBACCO USER.  The patient reports NO SIGNIFICANT ALCOHOL USE.     All Questions regarding food, transportation or housing were not answered today.    The patient was asked and declined the use of a free .    Advance Care Planning   Today we discussed advance care planning. She is interested in learning more about how to make Advance Directives. Information was provided and I offered to discuss more at her discretion.       Provided patient with a 5-10 year written  screening schedule and personal prevention plan. Recommendations were developed using the USPSTF age appropriate recommendations. Education, counseling, and referrals were provided as needed. After Visit Summary printed and given to patient, which includes a list of additional screenings\tests needed.    Follow up in about 1 month (around 6/15/2025) for Routine Follow Up. In addition to their scheduled follow up, the patient has also been instructed to follow up on as needed basis.     Future Appointments   Date Time Provider Department Center   6/17/2025 11:00 AM Brittney Hall MD [address] [facility] [place]

## 2025-05-16 ENCOUNTER — PATIENT MESSAGE (OUTPATIENT)
Dept: INTERNAL MEDICINE | Facility: CLINIC | Age: 75
End: 2025-05-16
Payer: MEDICARE

## 2025-05-16 DIAGNOSIS — I10 PRIMARY HYPERTENSION: ICD-10-CM

## 2025-05-16 DIAGNOSIS — I48.0 PAROXYSMAL ATRIAL FIBRILLATION: Primary | ICD-10-CM

## 2025-05-16 NOTE — TELEPHONE ENCOUNTER
Copied from CRM #[id number]. Topic: Appointments - Amb Referral  >> May 15, 2025  4:33 PM Gonzalo wrote:  .Who Called: Ngozi Webb    Does the patient already have the specialty appointment scheduled?:no  If yes, what is the date of that appointment?:  Referral to What Specialty:Cardiology  Reason for Referral:  Does the patient want the referral with a specific physician?:Yes  If yes, which provider?: [facility]   Is the specialist an [facility]?:    Preferred Method of Contact: Phone Call  Patient's Preferred Phone Number on File: [phone]   Best Call Back Number, if different:  Additional Information:

## 2025-05-29 DIAGNOSIS — E78.49 OTHER HYPERLIPIDEMIA: ICD-10-CM

## 2025-05-29 DIAGNOSIS — I48.0 PAROXYSMAL ATRIAL FIBRILLATION: ICD-10-CM

## 2025-05-29 RX ORDER — APIXABAN 5 MG/1
5 TABLET, FILM COATED ORAL 2 TIMES DAILY
Qty: 180 TABLET | Refills: 3 | Status: SHIPPED | OUTPATIENT
Start: 2025-05-29

## 2025-08-13 ENCOUNTER — OFFICE VISIT (OUTPATIENT)
Dept: INTERNAL MEDICINE | Facility: CLINIC | Age: 75
End: 2025-08-13
Payer: MEDICARE

## 2025-08-13 ENCOUNTER — TELEPHONE (OUTPATIENT)
Dept: INTERNAL MEDICINE | Facility: CLINIC | Age: 75
End: 2025-08-13

## 2025-08-13 ENCOUNTER — TELEPHONE (OUTPATIENT)
Dept: INTERNAL MEDICINE | Facility: CLINIC | Age: 75
End: 2025-08-13
Payer: MEDICARE

## 2025-08-13 VITALS
BODY MASS INDEX: 39.09 KG/M2 | SYSTOLIC BLOOD PRESSURE: 130 MMHG | OXYGEN SATURATION: 98 % | WEIGHT: 229 LBS | HEIGHT: 64 IN | DIASTOLIC BLOOD PRESSURE: 72 MMHG | HEART RATE: 68 BPM

## 2025-08-13 DIAGNOSIS — M51.362 DEGENERATION OF INTERVERTEBRAL DISC OF LUMBAR REGION WITH DISCOGENIC BACK PAIN AND LOWER EXTREMITY PAIN: ICD-10-CM

## 2025-08-13 DIAGNOSIS — G47.00 INSOMNIA, UNSPECIFIED TYPE: ICD-10-CM

## 2025-08-13 DIAGNOSIS — K21.9 GASTROESOPHAGEAL REFLUX DISEASE, UNSPECIFIED WHETHER ESOPHAGITIS PRESENT: ICD-10-CM

## 2025-08-13 DIAGNOSIS — M54.16 LUMBAR RADICULOPATHY: ICD-10-CM

## 2025-08-13 DIAGNOSIS — M79.2 NERVE PAIN: Primary | ICD-10-CM

## 2025-08-13 RX ORDER — FAMOTIDINE 20 MG/1
20 TABLET, FILM COATED ORAL 2 TIMES DAILY
Qty: 60 TABLET | Refills: 11 | Status: SHIPPED | OUTPATIENT
Start: 2025-08-13 | End: 2025-08-14 | Stop reason: SDUPTHER

## 2025-08-13 RX ORDER — DULOXETIN HYDROCHLORIDE 30 MG/1
30 CAPSULE, DELAYED RELEASE ORAL DAILY
Qty: 30 CAPSULE | Refills: 11 | Status: SHIPPED | OUTPATIENT
Start: 2025-08-13 | End: 2026-08-13

## 2025-08-14 DIAGNOSIS — K21.9 GASTROESOPHAGEAL REFLUX DISEASE, UNSPECIFIED WHETHER ESOPHAGITIS PRESENT: Primary | ICD-10-CM

## 2025-08-15 RX ORDER — FAMOTIDINE 20 MG/1
20 TABLET, FILM COATED ORAL 2 TIMES DAILY
Qty: 60 TABLET | Refills: 11 | Status: SHIPPED | OUTPATIENT
Start: 2025-08-15 | End: 2025-08-15 | Stop reason: SDUPTHER

## 2025-08-15 RX ORDER — FAMOTIDINE 20 MG/1
20 TABLET, FILM COATED ORAL 2 TIMES DAILY
Qty: 180 TABLET | Refills: 3 | Status: SHIPPED | OUTPATIENT
Start: 2025-08-15 | End: 2026-08-15

## 2025-08-26 ENCOUNTER — OFFICE VISIT (OUTPATIENT)
Facility: CLINIC | Age: 75
End: 2025-08-26
Payer: MEDICARE

## 2025-08-26 VITALS
HEART RATE: 70 BPM | WEIGHT: 225 LBS | DIASTOLIC BLOOD PRESSURE: 63 MMHG | HEIGHT: 64 IN | BODY MASS INDEX: 38.41 KG/M2 | SYSTOLIC BLOOD PRESSURE: 134 MMHG

## 2025-08-26 DIAGNOSIS — G89.29 CHRONIC BACK PAIN GREATER THAN 3 MONTHS DURATION: Primary | ICD-10-CM

## 2025-08-26 DIAGNOSIS — M54.9 CHRONIC BACK PAIN GREATER THAN 3 MONTHS DURATION: Primary | ICD-10-CM

## 2025-08-26 DIAGNOSIS — M51.369 DEGENERATION OF INTERVERTEBRAL DISC OF LUMBAR REGION, UNSPECIFIED WHETHER PAIN PRESENT: ICD-10-CM

## 2025-08-26 DIAGNOSIS — M54.16 LUMBAR RADICULOPATHY: ICD-10-CM

## 2025-08-26 PROCEDURE — 1101F PT FALLS ASSESS-DOCD LE1/YR: CPT | Mod: CPTII,,, | Performed by: NURSE PRACTITIONER

## 2025-08-26 PROCEDURE — 1125F AMNT PAIN NOTED PAIN PRSNT: CPT | Mod: CPTII,,, | Performed by: NURSE PRACTITIONER

## 2025-08-26 PROCEDURE — 99214 OFFICE O/P EST MOD 30 MIN: CPT | Mod: ,,, | Performed by: NURSE PRACTITIONER

## 2025-08-26 PROCEDURE — 3288F FALL RISK ASSESSMENT DOCD: CPT | Mod: CPTII,,, | Performed by: NURSE PRACTITIONER

## 2025-08-26 PROCEDURE — 1159F MED LIST DOCD IN RCRD: CPT | Mod: CPTII,,, | Performed by: NURSE PRACTITIONER

## 2025-08-26 PROCEDURE — 1160F RVW MEDS BY RX/DR IN RCRD: CPT | Mod: CPTII,,, | Performed by: NURSE PRACTITIONER

## 2025-08-26 PROCEDURE — 3075F SYST BP GE 130 - 139MM HG: CPT | Mod: CPTII,,, | Performed by: NURSE PRACTITIONER

## 2025-08-26 PROCEDURE — 3078F DIAST BP <80 MM HG: CPT | Mod: CPTII,,, | Performed by: NURSE PRACTITIONER

## (undated) DEVICE — SYR 3ML LL 18GA 1.5IN

## (undated) DEVICE — Device

## (undated) DEVICE — CONTRAST ISOVUE M 200 20ML VIL

## (undated) DEVICE — CHLORAPREP 10.5 ML APPLICATOR

## (undated) DEVICE — DRAPE UTILITY W/ TAPE 20X30IN

## (undated) DEVICE — GLOVE SIGNATURE MICRO LTX 6.5

## (undated) DEVICE — SET SMARTSITE EXT SMALLBORE NF

## (undated) DEVICE — NDL SYR 10ML 18X1.5 LL BLUNT

## (undated) DEVICE — NDL FLTR 5MCRN BLNT TIP 18GX1

## (undated) DEVICE — NDL HYPO REG 25G X 1 1/2

## (undated) DEVICE — DRAPE MEDIUM SHEET 40X70IN

## (undated) DEVICE — ADAPTER DUAL NSL LUER M-M 7FT